# Patient Record
Sex: MALE | Race: WHITE | Employment: FULL TIME | URBAN - METROPOLITAN AREA
[De-identification: names, ages, dates, MRNs, and addresses within clinical notes are randomized per-mention and may not be internally consistent; named-entity substitution may affect disease eponyms.]

---

## 2018-04-13 PROBLEM — Z00.00 ENCOUNTER FOR GENERAL ADULT MEDICAL EXAMINATION WITHOUT ABNORMAL FINDINGS: Status: ACTIVE | Noted: 2017-08-23

## 2018-04-13 PROBLEM — E78.5 HYPERLIPIDEMIA: Status: ACTIVE | Noted: 2017-03-30

## 2018-04-13 PROBLEM — I10 BENIGN ESSENTIAL HYPERTENSION: Status: ACTIVE | Noted: 2017-03-30

## 2018-04-13 PROBLEM — K40.90 INGUINAL HERNIA, LEFT: Status: ACTIVE | Noted: 2017-03-30

## 2018-05-17 PROBLEM — M62.838 MUSCLE SPASM: Status: ACTIVE | Noted: 2018-05-17

## 2018-05-17 PROBLEM — K21.9 GASTROESOPHAGEAL REFLUX DISEASE WITHOUT ESOPHAGITIS: Status: ACTIVE | Noted: 2018-05-17

## 2018-05-17 PROBLEM — K59.04 CHRONIC IDIOPATHIC CONSTIPATION: Status: ACTIVE | Noted: 2018-05-17

## 2018-12-06 ENCOUNTER — OFFICE VISIT (OUTPATIENT)
Dept: FAMILY MEDICINE CLINIC | Age: 53
End: 2018-12-06

## 2018-12-06 VITALS
WEIGHT: 202.9 LBS | HEART RATE: 78 BPM | HEIGHT: 72 IN | OXYGEN SATURATION: 100 % | BODY MASS INDEX: 27.48 KG/M2 | DIASTOLIC BLOOD PRESSURE: 88 MMHG | RESPIRATION RATE: 16 BRPM | SYSTOLIC BLOOD PRESSURE: 124 MMHG | TEMPERATURE: 95.5 F

## 2018-12-06 DIAGNOSIS — E78.5 HYPERLIPIDEMIA, UNSPECIFIED HYPERLIPIDEMIA TYPE: ICD-10-CM

## 2018-12-06 DIAGNOSIS — I10 BENIGN ESSENTIAL HYPERTENSION: Primary | ICD-10-CM

## 2018-12-06 DIAGNOSIS — Z01.89 LABORATORY EXAMINATION: ICD-10-CM

## 2018-12-06 DIAGNOSIS — S86.912A MUSCLE STRAIN OF LEFT LOWER LEG, INITIAL ENCOUNTER: ICD-10-CM

## 2018-12-06 DIAGNOSIS — Z12.5 PROSTATE CANCER SCREENING: ICD-10-CM

## 2018-12-06 RX ORDER — LISINOPRIL 20 MG/1
20 TABLET ORAL DAILY
Qty: 90 TAB | Refills: 0 | Status: SHIPPED | OUTPATIENT
Start: 2018-12-06 | End: 2019-03-06 | Stop reason: SDUPTHER

## 2018-12-06 RX ORDER — SIMVASTATIN 40 MG/1
40 TABLET, FILM COATED ORAL DAILY
Qty: 90 TAB | Refills: 0 | Status: SHIPPED | OUTPATIENT
Start: 2018-12-06 | End: 2019-03-06 | Stop reason: SDUPTHER

## 2018-12-06 NOTE — PROGRESS NOTES
Barbara Laird  Identified pt with two pt identifiers(name and ). Chief Complaint Patient presents with  New Patient [facility] Establish Care  Leg Pain  
  left leg 1. Have you been to the ER, urgent care clinic since your last visit? Hospitalized since your last visit? No 
 
2. Have you seen or consulted any other health care providers outside of the [address] [place] [address] since your last visit? Include any pap smears or colon screening. NO In the event something were to happen to you and you were unable to speak on your behalf, do you have an Advance Directive/ Living Will in place stating your wishes? NO If yes, do we have a copy on file NO If no, would you like information:     
 
 
 
Would you like to sign up for MyChart today, if you have not already done so? Patient has mychart If not, would you like information on MyChart, and how to sign up at a later time? no 
 
 
Medication reconciliation up to date and corrected with patient at this time. Today's provider has been notified of reason for visit, vitals and flowsheets obtained on patients. Reviewed record in preparation for visit, huddled with provider and have obtained necessary documentation. Health Maintenance Due Topic  Hepatitis C Screening  DTaP/Tdap/Td series (1 - Tdap)  Shingrix Vaccine Age 50> (1 of 2)  FOBT Q 1 YEAR AGE 50-75 Wt Readings from Last 3 Encounters:  
18 202 lb 14.4 oz (92 kg) 18 203 lb (92.1 kg) 18 206 lb (93.4 kg) Temp Readings from Last 3 Encounters:  
No data found for Temp BP Readings from Last 3 Encounters:  
18 124/80  
18 130/82  
10/20/17 112/76 Pulse Readings from Last 3 Encounters:  
18 70  
10/20/17 70 Vitals:  
 18 1503 BP: 124/88 Pulse: 78 Resp: 16 Temp: 95.5 °F (35.3 °C) TempSrc: Oral  
SpO2: 100% Weight: 202 lb 14.4 oz (92 kg) Height: 5' 11.65\" (1.82 m) PainSc:   6 PainLoc: Leg  
 Learning Assessment: 
:  
 
No flowsheet data found. Depression Screening: 
:  
 
PHQ over the last two weeks 12/6/2018 Little interest or pleasure in doing things Not at all Feeling down, depressed, irritable, or hopeless Not at all Total Score PHQ 2 0 Fall Risk Assessment: 
:  
 
Fall Risk Assessment, last 12 mths 12/6/2018 Able to walk? Yes Fall in past 12 months? No  
 
 
Abuse Screening: 
:  
 
Abuse Screening Questionnaire 12/6/2018 Do you ever feel afraid of your partner? Jonathon Binder Are you in a relationship with someone who physically or mentally threatens you? Jonathon Binder Is it safe for you to go home? Y  
 
 
ADL Screening: 
:  
 

## 2018-12-06 NOTE — PROGRESS NOTES
Found office online. Moved here in August.  [place] from [place]. Last saw PCP in [place]. Last labs 9/2017. On cholesterol, BP meds. Last [facility] 9/2017. Colonoscopy '14.  10 yr repeat. Eye doctor past yr. No flu shots. Sxs left knee and lower leg week ago. First time trying to run on treadmill. Visit Vitals /88 (BP 1 Location: Left arm, BP Patient Position: Sitting) Pulse 78 Temp 95.5 °F (35.3 °C) (Oral) Resp 16 Ht 5' 11.65\" (1.82 m) Wt 202 lb 14.4 oz (92 kg) SpO2 100% BMI 27.78 kg/m² Patient alert and cooperative. Left knee - no swelling, warmth, erythema. Negative collaterals, negative drawer. Patella freely mobile. Has palpation tenderness medially below the knee joint at the muscle insertion onto the bone. Assessment: 1. Left lower leg muscle/ligament strain from running on the treadmill. Plan: 
1. Ice heat, antiinflammatory, relative rest. 
2. Follow up in 7-10 days if not improving, sooner worse for PT evaluation. 3. Refilled current BP and cholesterol meds. 4. Return for [facility] and lab work. 5. Follow otherwise here prn. TOTAL FACE TO FACE TIME OF 20 MINUTES SPENT WITH PATIENT. GREATER THAN 50% OF TIME WAS SPENT IN COUNSELING AND/OR COORDINATION OF CARE. SEE HPI, ASSESSMENT AND PLAN FOR DETAILS.

## 2019-02-28 DIAGNOSIS — E55.9 VITAMIN D DEFICIENCY: Primary | ICD-10-CM

## 2019-02-28 LAB
25(OH)D3+25(OH)D2 SERPL-MCNC: 14.7 NG/ML (ref 30–100)
ALBUMIN SERPL-MCNC: 4.8 G/DL (ref 3.5–5.5)
ALBUMIN/GLOB SERPL: 2.1 {RATIO} (ref 1.2–2.2)
ALP SERPL-CCNC: 71 IU/L (ref 39–117)
ALT SERPL-CCNC: 16 IU/L (ref 0–44)
APPEARANCE UR: CLEAR
AST SERPL-CCNC: 19 IU/L (ref 0–40)
BASOPHILS # BLD AUTO: 0 X10E3/UL (ref 0–0.2)
BASOPHILS NFR BLD AUTO: 1 %
BILIRUB SERPL-MCNC: 0.6 MG/DL (ref 0–1.2)
BILIRUB UR QL STRIP: NEGATIVE
BUN SERPL-MCNC: 11 MG/DL (ref 6–24)
BUN/CREAT SERPL: 11 (ref 9–20)
CALCIUM SERPL-MCNC: 9.7 MG/DL (ref 8.7–10.2)
CHLORIDE SERPL-SCNC: 103 MMOL/L (ref 96–106)
CHOLEST SERPL-MCNC: 154 MG/DL (ref 100–199)
CO2 SERPL-SCNC: 24 MMOL/L (ref 20–29)
COLOR UR: YELLOW
CREAT SERPL-MCNC: 0.97 MG/DL (ref 0.76–1.27)
EOSINOPHIL # BLD AUTO: 0.1 X10E3/UL (ref 0–0.4)
EOSINOPHIL NFR BLD AUTO: 1 %
ERYTHROCYTE [DISTWIDTH] IN BLOOD BY AUTOMATED COUNT: 12.9 % (ref 12.3–15.4)
GLOBULIN SER CALC-MCNC: 2.3 G/DL (ref 1.5–4.5)
GLUCOSE SERPL-MCNC: 98 MG/DL (ref 65–99)
GLUCOSE UR QL: NEGATIVE
HCT VFR BLD AUTO: 44.5 % (ref 37.5–51)
HDLC SERPL-MCNC: 40 MG/DL
HGB BLD-MCNC: 15.8 G/DL (ref 13–17.7)
HGB UR QL STRIP: NEGATIVE
IMM GRANULOCYTES # BLD AUTO: 0 X10E3/UL (ref 0–0.1)
IMM GRANULOCYTES NFR BLD AUTO: 0 %
INTERPRETATION, 910389: NORMAL
KETONES UR QL STRIP: NEGATIVE
LDLC SERPL CALC-MCNC: 78 MG/DL (ref 0–99)
LEUKOCYTE ESTERASE UR QL STRIP: NEGATIVE
LYMPHOCYTES # BLD AUTO: 1.6 X10E3/UL (ref 0.7–3.1)
LYMPHOCYTES NFR BLD AUTO: 25 %
MCH RBC QN AUTO: 32.5 PG (ref 26.6–33)
MCHC RBC AUTO-ENTMCNC: 35.5 G/DL (ref 31.5–35.7)
MCV RBC AUTO: 92 FL (ref 79–97)
MICRO URNS: NORMAL
MONOCYTES # BLD AUTO: 0.5 X10E3/UL (ref 0.1–0.9)
MONOCYTES NFR BLD AUTO: 7 %
NEUTROPHILS # BLD AUTO: 4.4 X10E3/UL (ref 1.4–7)
NEUTROPHILS NFR BLD AUTO: 66 %
NITRITE UR QL STRIP: NEGATIVE
PH UR STRIP: 7.5 [PH] (ref 5–7.5)
PLATELET # BLD AUTO: 285 X10E3/UL (ref 150–379)
POTASSIUM SERPL-SCNC: 4.2 MMOL/L (ref 3.5–5.2)
PROT SERPL-MCNC: 7.1 G/DL (ref 6–8.5)
PROT UR QL STRIP: NEGATIVE
PSA SERPL-MCNC: 0.7 NG/ML (ref 0–4)
RBC # BLD AUTO: 4.86 X10E6/UL (ref 4.14–5.8)
SODIUM SERPL-SCNC: 142 MMOL/L (ref 134–144)
SP GR UR: 1.01 (ref 1–1.03)
TRIGL SERPL-MCNC: 181 MG/DL (ref 0–149)
TSH SERPL DL<=0.005 MIU/L-ACNC: 3.42 UIU/ML (ref 0.45–4.5)
UROBILINOGEN UR STRIP-MCNC: 0.2 MG/DL (ref 0.2–1)
VLDLC SERPL CALC-MCNC: 36 MG/DL (ref 5–40)
WBC # BLD AUTO: 6.6 X10E3/UL (ref 3.4–10.8)

## 2019-02-28 RX ORDER — ERGOCALCIFEROL 1.25 MG/1
50000 CAPSULE ORAL 2 TIMES WEEKLY
Qty: 24 CAP | Refills: 0 | Status: SHIPPED | OUTPATIENT
Start: 2019-03-01 | End: 2019-07-24 | Stop reason: ALTCHOICE

## 2019-02-28 NOTE — PROGRESS NOTES
Verbal Order Read Back Per Eyal Muñiz MD for Vitamin D 50,000 units 2 times a week for 3 months, # 24, PO, 0 refills on 2019 due to Vitamin D deficiency. Erinn Duenas verified correct name and  with PCP. Verbal Order Read Back Per Eyal Muñiz MD for Vitamin D lab draw.  Erinn Duenas verified correct name and  with PCP

## 2019-03-06 ENCOUNTER — OFFICE VISIT (OUTPATIENT)
Dept: FAMILY MEDICINE CLINIC | Age: 54
End: 2019-03-06

## 2019-03-06 VITALS
BODY MASS INDEX: 28.07 KG/M2 | HEIGHT: 71 IN | WEIGHT: 200.5 LBS | OXYGEN SATURATION: 96 % | TEMPERATURE: 96.1 F | DIASTOLIC BLOOD PRESSURE: 86 MMHG | RESPIRATION RATE: 18 BRPM | HEART RATE: 91 BPM | SYSTOLIC BLOOD PRESSURE: 116 MMHG

## 2019-03-06 DIAGNOSIS — E78.5 HYPERLIPIDEMIA, UNSPECIFIED HYPERLIPIDEMIA TYPE: ICD-10-CM

## 2019-03-06 DIAGNOSIS — I10 BENIGN ESSENTIAL HYPERTENSION: ICD-10-CM

## 2019-03-06 RX ORDER — SIMVASTATIN 40 MG/1
40 TABLET, FILM COATED ORAL DAILY
Qty: 90 TAB | Refills: 1 | Status: SHIPPED | OUTPATIENT
Start: 2019-03-06 | End: 2019-09-11 | Stop reason: SDUPTHER

## 2019-03-06 RX ORDER — LISINOPRIL 20 MG/1
20 TABLET ORAL DAILY
Qty: 90 TAB | Refills: 1 | Status: SHIPPED | OUTPATIENT
Start: 2019-03-06 | End: 2019-09-09 | Stop reason: SDUPTHER

## 2019-03-06 NOTE — PROGRESS NOTES
Health Maintenance Due   Topic Date Due    Hepatitis C Screening  1965    DTaP/Tdap/Td series (1 - Tdap) 04/01/1986    Shingrix Vaccine Age 50> (1 of 2) 04/01/2015    FOBT Q 1 YEAR AGE 50-75  04/01/2015       Chief Complaint   Patient presents with    Vitamin D Deficiency     Rm 2/ non-fasting/     Cholesterol Problem       1. Have you been to the ER, urgent care clinic since your last visit? Hospitalized since your last visit? No    2. Have you seen or consulted any other health care providers outside of the [address] [place] [address] since your last visit? Include any pap smears or colon screening. No    3) Do you have an Advance Directive on file? no    4) Are you interested in receiving information on Advance Directives? NO      Patient is accompanied by self I have received verbal consent from Criss Stack, [address] to discuss any/all medical information while they are present in the room.

## 2019-03-06 NOTE — PROGRESS NOTES
Right knee sxs resolved. In AM mult BMs. Loose stools. Some urgency. No abdm pain nor cramping. Thinks 4-5 yrs. Had colonoscopy 1-2 yrs ago. 10 yr repeat. Poss lactose pb. Father with stomach pbs. Has anxiety. Had CHP past August.  Needs meds refilled. Will get records of colonoscopy, tetanus booster, and hep C. Patient denies chest pain, dyspnea, unexpected weight change, unexpected pain, mood or memory changes. Visit Vitals  /86 (BP 1 Location: Right arm, BP Patient Position: Sitting)   Pulse 91   Temp 96.1 °F (35.6 °C) (Oral)   Resp 18   Ht 5' 11\" (1.803 m)   Wt 200 lb 8 oz (90.9 kg)   SpO2 96%   BMI 27.96 kg/m²     Patient alert and cooperative. Reviewed above. Assessment:  1. Symptoms consistent with possible IBS. Plan:  1. Recommended to try OTC fiber supplements, try probiotics. 2. Follow up with GI if no improvement. 3. Return here for annual in August.  4. Recheck otherwise prn.

## 2019-07-24 ENCOUNTER — OFFICE VISIT (OUTPATIENT)
Dept: FAMILY MEDICINE CLINIC | Age: 54
End: 2019-07-24

## 2019-07-24 VITALS
BODY MASS INDEX: 28.61 KG/M2 | RESPIRATION RATE: 18 BRPM | HEART RATE: 91 BPM | TEMPERATURE: 96.6 F | HEIGHT: 71 IN | DIASTOLIC BLOOD PRESSURE: 82 MMHG | SYSTOLIC BLOOD PRESSURE: 115 MMHG | WEIGHT: 204.4 LBS | OXYGEN SATURATION: 97 %

## 2019-07-24 DIAGNOSIS — F41.9 ANXIETY: ICD-10-CM

## 2019-07-24 DIAGNOSIS — K59.04 CHRONIC IDIOPATHIC CONSTIPATION: Primary | ICD-10-CM

## 2019-07-24 DIAGNOSIS — I10 BENIGN ESSENTIAL HYPERTENSION: ICD-10-CM

## 2019-07-24 DIAGNOSIS — E78.5 HYPERLIPIDEMIA, UNSPECIFIED HYPERLIPIDEMIA TYPE: ICD-10-CM

## 2019-07-24 RX ORDER — CITALOPRAM 10 MG/1
10 TABLET ORAL DAILY
Qty: 30 TAB | Refills: 1 | Status: SHIPPED | OUTPATIENT
Start: 2019-07-24 | End: 2020-11-11

## 2019-07-24 NOTE — PROGRESS NOTES
Raphael Hewitt  Identified pt with two pt identifiers(name and ). Chief Complaint   Patient presents with    GI Problem     going to the bathroom multiple times during the morning    Anxiety    Request For New Medication     anxiety medication    Blood Pressure Check       1. Have you been to the ER, urgent care clinic since your last visit? Hospitalized since your last visit? No    2. Have you seen or consulted any other health care providers outside of the [address] [place] [address] since your last visit? Include any pap smears or colon screening. No      Would you like to sign up for MyChart today, if you have not already done so? Patient has a mychart  If not, would you like information on MyChart, and how to sign up at a later time? No      Medication reconciliation up to date and corrected with patient at this time. Today's provider has been notified of reason for visit, vitals and flowsheets obtained on patients. Reviewed record in preparation for visit, huddled with provider and have obtained necessary documentation. Health Maintenance Due   Topic    Hepatitis C Screening     DTaP/Tdap/Td series (1 - Tdap)    Shingrix Vaccine Age 50> (1 of 2)       Wt Readings from Last 3 Encounters:   19 204 lb 6.4 oz (92.7 kg)   19 200 lb 8 oz (90.9 kg)   18 202 lb 14.4 oz (92 kg)     Temp Readings from Last 3 Encounters:   19 96.6 °F (35.9 °C) (Oral)   19 96.1 °F (35.6 °C) (Oral)   18 95.5 °F (35.3 °C) (Oral)     BP Readings from Last 3 Encounters:   19 115/82   19 116/86   18 124/88     Pulse Readings from Last 3 Encounters:   19 91   19 91   18 78     Vitals:    19 1456   BP: 115/82   Pulse: 91   Resp: 18   Temp: 96.6 °F (35.9 °C)   TempSrc: Oral   SpO2: 97%   Weight: 204 lb 6.4 oz (92.7 kg)   Height: 5' 11\" (1.803 m)   PainSc:   0 - No pain         Learning Assessment:  :     No flowsheet data found.     Depression Screening:  :     3 most recent PHQ Screens 3/6/2019   Little interest or pleasure in doing things Not at all   Feeling down, depressed, irritable, or hopeless Not at all   Total Score PHQ 2 0       Fall Risk Assessment:  :     Fall Risk Assessment, last 12 mths 3/6/2019   Able to walk? Yes   Fall in past 12 months? No       Abuse Screening:  :     Abuse Screening Questionnaire 3/6/2019 12/6/2018   Do you ever feel afraid of your partner? N N   Are you in a relationship with someone who physically or mentally threatens you? N N   Is it safe for you to go home?  Y Y       ADL Screening:  :     ADL Assessment 12/6/2018   Feeding yourself No Help Needed   Getting from bed to chair No Help Needed   Getting dressed No Help Needed   Bathing or showering No Help Needed   Walk across the room (includes cane/walker) No Help Needed   Using the telphone No Help Needed   Taking your medications No Help Needed   Preparing meals No Help Needed   Managing money (expenses/bills) No Help Needed   Moderately strenuous housework (laundry) No Help Needed   Shopping for personal items (toiletries/medicines) No Help Needed   Shopping for groceries No Help Needed   Driving No Help Needed   Climbing a flight of stairs No Help Needed   Getting to places beyond walking distances No Help Needed

## 2019-07-24 NOTE — PROGRESS NOTES
Here for f/u of digestive pbs. Started Metamucil. Still gets up 5 AM and goes to [place] 4x before leaves for work. Does O.K. during day. No pb in PM.  Never on anxiety meds in past.  Past year more anxious. Family member committed suicide past March. Plenty of exercise. Watches diet. Patient denies chest pain, dyspnea, unexpected weight change, unexpected pain, mood or memory changes. Visit Vitals  /82 (BP 1 Location: Left arm, BP Patient Position: Sitting)   Pulse 91   Temp 96.6 °F (35.9 °C) (Oral)   Resp 18   Ht 5' 11\" (1.803 m)   Wt 204 lb 6.4 oz (92.7 kg)   SpO2 97%   BMI 28.51 kg/m²     Patient alert and cooperative. Reviewed above. Assessment:  1. Probable IBS with constipation and frequent stooling in the morning. 2. Anxiety disorder. Plan:  1. Set up GI for further evaluation. 2. Begin Celexa 10 mg daily. 3. Follow up through [address] how doing for anxiety. 4. Recheck here otherwise prn.

## 2019-07-25 LAB — 25(OH)D3+25(OH)D2 SERPL-MCNC: 25.9 NG/ML (ref 30–100)

## 2019-08-09 ENCOUNTER — HOSPITAL ENCOUNTER (OUTPATIENT)
Dept: GENERAL RADIOLOGY | Age: 54
Discharge: HOME | End: 2019-08-09
Attending: INTERNAL MEDICINE
Payer: COMMERCIAL

## 2019-08-09 DIAGNOSIS — R19.4 CHANGE IN BOWEL HABIT: ICD-10-CM

## 2019-08-09 DIAGNOSIS — K59.04 CHRONIC IDIOPATHIC CONSTIPATION: ICD-10-CM

## 2019-08-09 PROCEDURE — 74018 RADEX ABDOMEN 1 VIEW: CPT

## 2019-09-09 DIAGNOSIS — I10 BENIGN ESSENTIAL HYPERTENSION: ICD-10-CM

## 2019-09-10 NOTE — TELEPHONE ENCOUNTER
PCP: Cindy Marie MD    Last appt: 7/24/2019  No future appointments.     Requested Prescriptions     Pending Prescriptions Disp Refills    lisinopril (PRINIVIL, ZESTRIL) 20 mg tablet [Pharmacy Med Name: LISINOPRIL 20MG TABLETS] 90 Tab 0     Sig: TAKE 1 TABLET BY MOUTH EVERY DAY       Prior labs and Blood pressures:  BP Readings from Last 3 Encounters:   07/24/19 115/82   03/06/19 116/86   12/06/18 124/88     Lab Results   Component Value Date/Time    Sodium 142 02/27/2019 08:23 AM    Potassium 4.2 02/27/2019 08:23 AM    Chloride 103 02/27/2019 08:23 AM    CO2 24 02/27/2019 08:23 AM    Anion gap 13 09/09/2017 07:26 AM    Glucose 98 02/27/2019 08:23 AM    BUN 11 02/27/2019 08:23 AM    Creatinine 0.97 02/27/2019 08:23 AM    BUN/Creatinine ratio 11 02/27/2019 08:23 AM    GFR est  02/27/2019 08:23 AM    GFR est non-AA 89 02/27/2019 08:23 AM    Calcium 9.7 02/27/2019 08:23 AM     No results found for: HBA1C, HGBE8, KVR2JKKL, PBZ0FNTP  Lab Results   Component Value Date/Time    Cholesterol, total 154 02/27/2019 08:23 AM    HDL Cholesterol 40 02/27/2019 08:23 AM    LDL, calculated 78 02/27/2019 08:23 AM    VLDL, calculated 36 02/27/2019 08:23 AM    Triglyceride 181 (H) 02/27/2019 08:23 AM     Lab Results   Component Value Date/Time    VITAMIN D, 25-HYDROXY 25.9 (L) 07/24/2019 04:09 PM       Lab Results   Component Value Date/Time    TSH 3.420 02/27/2019 08:23 AM

## 2019-09-11 RX ORDER — LISINOPRIL 20 MG/1
TABLET ORAL
Qty: 90 TAB | Refills: 1 | Status: SHIPPED | OUTPATIENT
Start: 2019-09-11 | End: 2020-03-17

## 2019-09-12 RX ORDER — SIMVASTATIN 40 MG/1
40 TABLET, FILM COATED ORAL DAILY
Qty: 90 TAB | Refills: 1 | Status: SHIPPED | OUTPATIENT
Start: 2019-09-12 | End: 2020-03-17

## 2019-09-24 PROBLEM — Z00.00 ENCOUNTER FOR GENERAL ADULT MEDICAL EXAMINATION WITHOUT ABNORMAL FINDINGS: Status: RESOLVED | Noted: 2017-08-23 | Resolved: 2019-09-24

## 2020-02-21 ENCOUNTER — HOSPITAL ENCOUNTER (OUTPATIENT)
Dept: GENERAL RADIOLOGY | Age: 55
Discharge: HOME | End: 2020-02-21
Attending: INTERNAL MEDICINE
Payer: COMMERCIAL

## 2020-02-21 DIAGNOSIS — R05.9 COUGH: ICD-10-CM

## 2020-02-21 PROCEDURE — 71046 X-RAY EXAM CHEST 2 VIEWS: CPT

## 2020-03-17 DIAGNOSIS — I10 BENIGN ESSENTIAL HYPERTENSION: ICD-10-CM

## 2020-03-17 RX ORDER — LISINOPRIL 20 MG/1
TABLET ORAL
Qty: 30 TAB | Refills: 0 | Status: SHIPPED | OUTPATIENT
Start: 2020-03-17 | End: 2020-04-16

## 2020-03-17 RX ORDER — LISINOPRIL 20 MG/1
TABLET ORAL
Qty: 30 TAB | Refills: 0 | Status: SHIPPED | OUTPATIENT
Start: 2020-03-17 | End: 2020-03-17

## 2020-03-17 RX ORDER — SIMVASTATIN 40 MG/1
TABLET, FILM COATED ORAL
Qty: 30 TAB | Refills: 0 | Status: SHIPPED | OUTPATIENT
Start: 2020-03-17 | End: 2020-04-16

## 2020-03-17 RX ORDER — SIMVASTATIN 40 MG/1
TABLET, FILM COATED ORAL
Qty: 30 TAB | Refills: 0 | Status: SHIPPED | OUTPATIENT
Start: 2020-03-17 | End: 2020-03-17

## 2020-07-22 ENCOUNTER — TELEPHONE (OUTPATIENT)
Dept: FAMILY MEDICINE CLINIC | Age: 55
End: 2020-07-22

## 2020-07-22 ENCOUNTER — VIRTUAL VISIT (OUTPATIENT)
Dept: FAMILY MEDICINE CLINIC | Age: 55
End: 2020-07-22

## 2020-07-22 DIAGNOSIS — E78.5 HYPERLIPIDEMIA, UNSPECIFIED HYPERLIPIDEMIA TYPE: ICD-10-CM

## 2020-07-22 DIAGNOSIS — Z12.5 PROSTATE CANCER SCREENING: ICD-10-CM

## 2020-07-22 DIAGNOSIS — I10 BENIGN ESSENTIAL HYPERTENSION: Primary | ICD-10-CM

## 2020-07-22 DIAGNOSIS — I10 BENIGN ESSENTIAL HYPERTENSION: ICD-10-CM

## 2020-07-22 DIAGNOSIS — E55.9 VITAMIN D DEFICIENCY: ICD-10-CM

## 2020-07-22 RX ORDER — LOSARTAN POTASSIUM 50 MG/1
50 TABLET ORAL DAILY
Qty: 30 TAB | Refills: 0 | Status: SHIPPED | OUTPATIENT
Start: 2020-07-22 | End: 2020-09-04 | Stop reason: SDUPTHER

## 2020-07-22 NOTE — TELEPHONE ENCOUNTER
Called pt to inform that I have faxed labs over to the [facility] as requested. Pt verified understanding.

## 2020-07-22 NOTE — PROGRESS NOTES
**THIS IS A VIRTUAL VISIT VIA A VIDEO SYNCHRONOUS DISCUSSION OVER DOXY. ME PATIENT AGREED TO HAVE THEIR CARE DELIVERED OVER A [employer] VIDEO VISIT IN PLACE OF THEIR REGULARLY SCHEDULED OFFICE VISIT**       Risa Xie is a 54 y.o. male who was seen by synchronous (real-time) audio-video technology on 7/22/2020 for Medication Evaluation        Assessment & Plan:   Diagnoses and all orders for this visit:    1. Benign essential hypertension  -     losartan (COZAAR) 50 mg tablet; Take 1 Tab by mouth daily.  -     CBC WITH AUTOMATED DIFF; Future  -     URINALYSIS W/ RFLX MICROSCOPIC; Future  -     TSH 3RD GENERATION; Future  -     METABOLIC PANEL, COMPREHENSIVE; Future  -     LIPID PANEL; Future    2. Hyperlipidemia, unspecified hyperlipidemia type  -     LIPID PANEL; Future    3. Prostate cancer screening  -     PSA, DIAGNOSTIC (PROSTATE SPECIFIC AG); Future    4. Vitamin D deficiency  -     VITAMIN D, 25 HYDROXY; Future      The complexity of medical decision making for this visit is moderate         I spent at least 8 minutes on this visit with this established patient. Subjective:     Past Feb dx with flu. Saw pulm. Had allergy testing. Had VV with pulm who recommended change in BP med for breathing. Pulm also ordered some labs. Prior to Admission medications    Medication Sig Start Date End Date Taking? Authorizing Provider   lisinopriL (PRINIVIL, ZESTRIL) 20 mg tablet TAKE 1 TABLET BY MOUTH EVERY DAY 7/15/20  Yes Jamison Muñiz MD   simvastatin (ZOCOR) 40 mg tablet TAKE 1 TABLET BY MOUTH EVERY DAY 7/15/20  Yes Jamison Muñiz MD   psyllium husk (METAMUCIL PO) Take  by mouth. Yes Provider, Historical   ibuprofen (MOTRIN) 400 mg tablet Take 1 Tab by mouth four (4) times daily as needed. 5/16/17  Yes Provider, Historical   citalopram (CELEXA) 10 mg tablet Take 1 Tab by mouth daily.  7/24/19   Jamison Muñiz MD     Patient Active Problem List   Diagnosis Code    Hyperlipidemia E78.5    Benign essential hypertension I10    Inguinal hernia, left K40.90    Chronic idiopathic constipation K59.04    Gastroesophageal reflux disease without esophagitis K21.9    Muscle spasm M62.838     Current Outpatient Medications   Medication Sig Dispense Refill    lisinopriL (PRINIVIL, ZESTRIL) 20 mg tablet TAKE 1 TABLET BY MOUTH EVERY DAY 90 Tab 0    simvastatin (ZOCOR) 40 mg tablet TAKE 1 TABLET BY MOUTH EVERY DAY 90 Tab 0    psyllium husk (METAMUCIL PO) Take  by mouth.  ibuprofen (MOTRIN) 400 mg tablet Take 1 Tab by mouth four (4) times daily as needed.  citalopram (CELEXA) 10 mg tablet Take 1 Tab by mouth daily.  30 Tab 1     Allergies   Allergen Reactions    Cat Dander Other (comments)    Ragweed Pollen Other (comments)     Past Medical History:   Diagnosis Date    Hyperlipidemia     Hypertension     Inguinal hernia     left     Past Surgical History:   Procedure Laterality Date    HX HERNIA REPAIR Left 05/12/2017    laparoscopic totally extraperitoneal left inguinal hernia repair with meth     HX TONSILLECTOMY       Family History   Problem Relation Age of Onset    Asthma Mother     Heart Disease Father     Asthma Brother     Cancer Maternal Grandmother     Heart Disease Maternal Grandfather         hardening of arteries    Hypertension Other     Macular Degen Other      Social History     Tobacco Use    Smoking status: Never Smoker    Smokeless tobacco: Never Used   Substance Use Topics    Alcohol use: No       ROS    Objective:     Patient-Reported Vitals 7/22/2020   Patient-Reported Weight 200lb   Patient-Reported Height 6'   Patient-Reported Temperature 98.2        [INSTRUCTIONS:  \"[x]\" Indicates a positive item  \"[]\" Indicates a negative item  -- DELETE ALL ITEMS NOT EXAMINED]    Constitutional: [x] Appears well-developed and well-nourished [x] No apparent distress      [] Abnormal -     Mental status: [x] Alert and awake  [x] Oriented to person/place/time [x] Able to follow commands    [] Abnormal -     Eyes:   EOM    [x]  Normal    [] Abnormal -   Sclera  [x]  Normal    [] Abnormal -          Discharge [x]  None visible   [] Abnormal -     Pulmonary/Chest: [x] Respiratory effort normal   [x] No visualized signs of difficulty breathing or respiratory distress        [] Abnormal -      Psychiatric:       [x] Normal Affect [] Abnormal -        [x] No Hallucinations    Other pertinent observable physical exam findings:-        We discussed the expected course, resolution and complications of the diagnosis(es) in detail. Medication risks, benefits, costs, interactions, and alternatives were discussed as indicated. I advised him to contact the office if his condition worsens, changes or fails to improve as anticipated. He expressed understanding with the diagnosis(es) and plan. Dolores Piedra, who was evaluated through a patient-initiated, synchronous (real-time) audio-video encounter, and/or his healthcare decision maker, is aware that it is a billable service, with coverage as determined by his insurance carrier. He provided verbal consent to proceed: Yes, and patient identification was verified. It was conducted pursuant to the emergency declaration under the [facility], [address] [place] [address] authority and the [email or web address] and [employer] Act. A caregiver was present when appropriate. Ability to conduct physical exam was limited. I was at home. The patient was at home.       Michelle Martinez MD

## 2020-07-22 NOTE — PROGRESS NOTES
Dolores Piedra is a 54 y.o. male  HIPAA verified by two patient identifiers. Health Maintenance Due   Topic    Hepatitis C Screening     DTaP/Tdap/Td series (1 - Tdap)    Shingrix Vaccine Age 49> (1 of 2)    Lipid Screen      Patient-Reported Vitals 7/22/2020   Patient-Reported Weight 200lb   Patient-Reported Height 6'   Patient-Reported Temperature 98.2       Pain Scale:0 /10  Pain Location:             1. Have you been to the ER, urgent care clinic since your last visit? Hospitalized since your last visit? No    2. Have you seen or consulted any other health care providers outside of the [address] [place] [address] since your last visit? Include any pap smears or colon screening.  No      # [phone] Patient in meeting from 9:30 to 10:30

## 2020-07-22 NOTE — TELEPHONE ENCOUNTER
----- Message from Roderick Irene sent at 7/22/2020 12:03 PM EDT -----  Regarding: Dr. Medina Brownwood: [phone]  Caller's first and last name: n/a  Callback required yes/no and why: yes  Best contact number(s): [phone]  Details to clarify the request: He met with Dr. Clarice Craig today 7/22 and he is scheduled for lab work this Friday 7/24 at [facility] on [facility] so he needs Dr. Clarice Craig to send his lab order there.

## 2020-07-25 LAB
25(OH)D3+25(OH)D2 SERPL-MCNC: 36.1 NG/ML (ref 30–100)
ALBUMIN SERPL-MCNC: 4.7 G/DL (ref 3.8–4.9)
ALBUMIN/GLOB SERPL: 1.9 {RATIO} (ref 1.2–2.2)
ALP SERPL-CCNC: 61 IU/L (ref 39–117)
ALT SERPL-CCNC: 20 IU/L (ref 0–44)
APPEARANCE UR: CLEAR
AST SERPL-CCNC: 25 IU/L (ref 0–40)
BASOPHILS # BLD AUTO: 0.1 X10E3/UL (ref 0–0.2)
BASOPHILS NFR BLD AUTO: 1 %
BILIRUB SERPL-MCNC: 0.4 MG/DL (ref 0–1.2)
BILIRUB UR QL STRIP: NEGATIVE
BUN SERPL-MCNC: 13 MG/DL (ref 6–24)
BUN/CREAT SERPL: 15 (ref 9–20)
CALCIUM SERPL-MCNC: 9.7 MG/DL (ref 8.7–10.2)
CHLORIDE SERPL-SCNC: 102 MMOL/L (ref 96–106)
CHOLEST SERPL-MCNC: 157 MG/DL (ref 100–199)
CO2 SERPL-SCNC: 22 MMOL/L (ref 20–29)
COLOR UR: YELLOW
CREAT SERPL-MCNC: 0.89 MG/DL (ref 0.76–1.27)
EOSINOPHIL # BLD AUTO: 0.1 X10E3/UL (ref 0–0.4)
EOSINOPHIL NFR BLD AUTO: 1 %
ERYTHROCYTE [DISTWIDTH] IN BLOOD BY AUTOMATED COUNT: 12.7 % (ref 11.6–15.4)
GLOBULIN SER CALC-MCNC: 2.5 G/DL (ref 1.5–4.5)
GLUCOSE SERPL-MCNC: 99 MG/DL (ref 65–99)
GLUCOSE UR QL: NEGATIVE
HCT VFR BLD AUTO: 44.7 % (ref 37.5–51)
HDLC SERPL-MCNC: 38 MG/DL
HGB BLD-MCNC: 15.6 G/DL (ref 13–17.7)
HGB UR QL STRIP: NEGATIVE
IMM GRANULOCYTES # BLD AUTO: 0 X10E3/UL (ref 0–0.1)
IMM GRANULOCYTES NFR BLD AUTO: 0 %
INTERPRETATION, 910389: NORMAL
KETONES UR QL STRIP: NEGATIVE
LDLC SERPL CALC-MCNC: 46 MG/DL (ref 0–99)
LEUKOCYTE ESTERASE UR QL STRIP: NEGATIVE
LYMPHOCYTES # BLD AUTO: 2.1 X10E3/UL (ref 0.7–3.1)
LYMPHOCYTES NFR BLD AUTO: 32 %
MCH RBC QN AUTO: 32.3 PG (ref 26.6–33)
MCHC RBC AUTO-ENTMCNC: 34.9 G/DL (ref 31.5–35.7)
MCV RBC AUTO: 93 FL (ref 79–97)
MICRO URNS: NORMAL
MONOCYTES # BLD AUTO: 0.4 X10E3/UL (ref 0.1–0.9)
MONOCYTES NFR BLD AUTO: 7 %
NEUTROPHILS # BLD AUTO: 3.9 X10E3/UL (ref 1.4–7)
NEUTROPHILS NFR BLD AUTO: 59 %
NITRITE UR QL STRIP: NEGATIVE
PH UR STRIP: 6 [PH] (ref 5–7.5)
PLATELET # BLD AUTO: 313 X10E3/UL (ref 150–450)
POTASSIUM SERPL-SCNC: 4.1 MMOL/L (ref 3.5–5.2)
PROT SERPL-MCNC: 7.2 G/DL (ref 6–8.5)
PROT UR QL STRIP: NEGATIVE
PSA SERPL-MCNC: 0.4 NG/ML (ref 0–4)
RBC # BLD AUTO: 4.83 X10E6/UL (ref 4.14–5.8)
SODIUM SERPL-SCNC: 141 MMOL/L (ref 134–144)
SP GR UR: 1.01 (ref 1–1.03)
TRIGL SERPL-MCNC: 364 MG/DL (ref 0–149)
TSH SERPL DL<=0.005 MIU/L-ACNC: 3.48 UIU/ML (ref 0.45–4.5)
UROBILINOGEN UR STRIP-MCNC: 0.2 MG/DL (ref 0.2–1)
VLDLC SERPL CALC-MCNC: 73 MG/DL (ref 5–40)
WBC # BLD AUTO: 6.6 X10E3/UL (ref 3.4–10.8)

## 2020-09-04 DIAGNOSIS — I10 BENIGN ESSENTIAL HYPERTENSION: ICD-10-CM

## 2020-09-04 NOTE — TELEPHONE ENCOUNTER
Requested Prescriptions     Pending Prescriptions Disp Refills    losartan (COZAAR) 50 mg tablet 30 Tab 0     Sig: Take 1 Tab by mouth daily.      PATIENT CALLED AND STATED THAT THE LOSARTIN IS WORKING GOOD AND HE NEEDS A REFILL

## 2020-09-07 RX ORDER — LOSARTAN POTASSIUM 50 MG/1
50 TABLET ORAL DAILY
Qty: 30 TAB | Refills: 0 | Status: SHIPPED | OUTPATIENT
Start: 2020-09-07 | End: 2020-09-14

## 2020-09-08 DIAGNOSIS — I10 BENIGN ESSENTIAL HYPERTENSION: ICD-10-CM

## 2020-09-14 RX ORDER — LOSARTAN POTASSIUM 50 MG/1
TABLET ORAL
Qty: 90 TAB | Refills: 0 | Status: SHIPPED | OUTPATIENT
Start: 2020-09-14 | End: 2020-11-11 | Stop reason: SDUPTHER

## 2020-11-11 ENCOUNTER — OFFICE VISIT (OUTPATIENT)
Dept: FAMILY MEDICINE CLINIC | Age: 55
End: 2020-11-11
Payer: COMMERCIAL

## 2020-11-11 VITALS
BODY MASS INDEX: 29.41 KG/M2 | WEIGHT: 210.1 LBS | TEMPERATURE: 97.9 F | RESPIRATION RATE: 17 BRPM | HEIGHT: 71 IN | SYSTOLIC BLOOD PRESSURE: 126 MMHG | HEART RATE: 106 BPM | DIASTOLIC BLOOD PRESSURE: 86 MMHG | OXYGEN SATURATION: 95 %

## 2020-11-11 DIAGNOSIS — K59.04 CHRONIC IDIOPATHIC CONSTIPATION: ICD-10-CM

## 2020-11-11 DIAGNOSIS — I10 BENIGN ESSENTIAL HYPERTENSION: ICD-10-CM

## 2020-11-11 DIAGNOSIS — E78.5 HYPERLIPIDEMIA, UNSPECIFIED HYPERLIPIDEMIA TYPE: Primary | ICD-10-CM

## 2020-11-11 DIAGNOSIS — E78.1 HYPERTRIGLYCERIDEMIA: ICD-10-CM

## 2020-11-11 PROCEDURE — 99214 OFFICE O/P EST MOD 30 MIN: CPT | Performed by: PHYSICIAN ASSISTANT

## 2020-11-11 RX ORDER — SIMVASTATIN 40 MG/1
TABLET, FILM COATED ORAL
Qty: 90 TAB | Refills: 2 | Status: SHIPPED | OUTPATIENT
Start: 2020-11-11 | End: 2021-04-27 | Stop reason: SDUPTHER

## 2020-11-11 RX ORDER — LOSARTAN POTASSIUM 50 MG/1
TABLET ORAL
Qty: 90 TAB | Refills: 3 | Status: SHIPPED | OUTPATIENT
Start: 2020-11-11 | End: 2021-06-11 | Stop reason: SDUPTHER

## 2020-11-11 NOTE — PROGRESS NOTES
HPI:  54 y.o.  presents for follow up appointment. No hospital, ER or specialist visits since last primary care visit except as noted below. PCP is Dr Shawnee Hernandez, who is out of the office, and will be retiring 12/31/20, so pt will resume care with me    Hypertension and HLD- compliant with medication, losartan 50 mg daily (lisinopril causes cough), and simvastatin 40 mg; no home monitoring. No history of heart disease or stroke. No chest pain, no shortness of breath, no headaches, no lower extremity swelling.       Has form from insurance co that they will cover a BP cuff, needs bicep circumference, I measure today and it is 12 inches  TG were up last check, has been eating frozen [employer] mac and cheese for lunch and chips every day  Lab Results   Component Value Date/Time    Cholesterol, total 157 07/24/2020 04:41 PM    HDL Cholesterol 38 (L) 07/24/2020 04:41 PM    LDL, calculated 46 07/24/2020 04:41 PM    VLDL, calculated 73 (H) 07/24/2020 04:41 PM    Triglyceride 364 (H) 07/24/2020 04:41 PM       Feeling of incomplete emptying after BM - saw GI, recommended fiber supplement which pt states seems to help, another med was suggested but pt was concerned about sdie effects so is just sticking with fiber; had normal colonoscopy in [facility] 2017    Anxiety - did not take the celexa last year, grief reaction with suicide of nephew in march 2018; has \"navigated\" through the family changes with good support; his sister is now raising her 4 grandchildren    , 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[employer], works as  for intellectually disabled population, no children    Patient Active Problem List    Diagnosis    Chronic idiopathic constipation    Gastroesophageal reflux disease without esophagitis    Muscle spasm    Hyperlipidemia    Benign essential hypertension    Inguinal hernia, left         Past Medical History:   Diagnosis Date    Hyperlipidemia     Hypertension     Inguinal hernia     left       Social History     Tobacco Use    Smoking status: Never Smoker    Smokeless tobacco: Never Used   Substance Use Topics    Alcohol use: No    Drug use: No       Outpatient Medications Marked as Taking for the 11/11/20 encounter (Office Visit) with Chhaya Chamberlain PA-C   Medication Sig Dispense Refill    simvastatin (ZOCOR) 40 mg tablet TAKE 1 TABLET BY MOUTH EVERY DAY 90 Tab 2    losartan (COZAAR) 50 mg tablet TAKE 1 TABLET BY MOUTH DAILY 90 Tab 0    psyllium husk (METAMUCIL PO) Take  by mouth.  citalopram (CELEXA) 10 mg tablet Take 1 Tab by mouth daily. 30 Tab 1    ibuprofen (MOTRIN) 400 mg tablet Take 1 Tab by mouth four (4) times daily as needed. Allergies   Allergen Reactions    Cat Dander Other (comments)    Ragweed Pollen Other (comments)       ROS:  ROS negative except as per HPI. PE:  Visit Vitals  /86 (BP 1 Location: Right arm, BP Patient Position: Sitting)   Pulse (!) 106   Temp 97.9 °F (36.6 °C) (Temporal)   Resp 17   Ht 5' 11\" (1.803 m)   Wt 210 lb 1.6 oz (95.3 kg)   SpO2 95%   BMI 29.30 kg/m²     Gen: alert, oriented, no acute distress  Head: normocephalic, atraumatic  Eyes: pupils equal round reactive to light, sclera clear, conjunctiva clear  Neck: symmetric normal sized thyroid, no carotid bruits, no jugular vein distention  Resp: no increase work of breathing, lungs clear to ausculation bilaterally, no wheezing, rales or rhonchi  CV: S1, S2 normal.  No murmurs, rubs, or gallops. Abd: soft, not tender, not distended. No hepatosplenomegaly. Normal bowel sounds. Neuro:normal movement in all extremities, sensation intact and symmetric. Skin: no lesion or rash  Extremities: no cyanosis or edema; right upper arm circumference 12 inches    No results found for this visit on 11/11/20. Assessment/Plan:    1.  Hyperlipidemia, unspecified hyperlipidemia type  LDL 46  Continue current zocor  - simvastatin (ZOCOR) 40 mg tablet; TAKE 1 TABLET BY MOUTH EVERY DAY  Dispense: 90 Tab; Refill: 2  - LIPID PANEL; Future    2. Hypertriglyceridemia    We reviewed low triglyceride diet in detail, handout given  Advised to stop the [employer] mac and cheese every day  Recheck in 3-4 months    3. Benign essential hypertension  HTN - well controlled. Continue current medication. Exercise, and low salt/ low caffeine diet were discussed. - losartan (COZAAR) 50 mg tablet; TAKE 1 TABLET BY MOUTH DAILY  Dispense: 90 Tab; Refill: 3    4. Chronic idiopathic constipation  Controlled on daily fiber supplement    Follow-up and Dispositions    · Return in about 6 months (around 5/11/2021) for fasting lab appt for lat Feb or March, 6 mo visit for HTN. Health Maintenance reviewed - updated, declined flu shot    Orders Placed This Encounter    LIPID PANEL     Standing Status:   Future     Standing Expiration Date:   11/11/2021    losartan (COZAAR) 50 mg tablet     Sig: TAKE 1 TABLET BY MOUTH DAILY     Dispense:  90 Tab     Refill:  3    simvastatin (ZOCOR) 40 mg tablet     Sig: TAKE 1 TABLET BY MOUTH EVERY DAY     Dispense:  90 Tab     Refill:  2       Medications Discontinued During This Encounter   Medication Reason    citalopram (CELEXA) 10 mg tablet Not A Current Medication    losartan (COZAAR) 50 mg tablet REORDER    simvastatin (ZOCOR) 40 mg tablet REORDER       Current Outpatient Medications   Medication Sig Dispense Refill    losartan (COZAAR) 50 mg tablet TAKE 1 TABLET BY MOUTH DAILY 90 Tab 3    simvastatin (ZOCOR) 40 mg tablet TAKE 1 TABLET BY MOUTH EVERY DAY 90 Tab 2    psyllium husk (METAMUCIL PO) Take  by mouth.  ibuprofen (MOTRIN) 400 mg tablet Take 1 Tab by mouth four (4) times daily as needed. Recommended healthy diet low in carbohydrates, fats, sodium and cholesterol. Recommended regular cardiovascular exercise 3-6 times per week for 30-60 minutes daily. Verbal and written instructions (see AVS) provided.   Patient expresses understanding of diagnosis and treatment plan.

## 2020-11-11 NOTE — PROGRESS NOTES
Parish Rojo is a 54 y.o. male    HIPAA verified by two patient identifiers. Chief Complaint   Patient presents with    Medication Refill     For future refills      1. Have you been to the ER, urgent care clinic since your last visit? Hospitalized since your last visit? No    2. Have you seen or consulted any other health care providers outside of the [address] [place] [address] since your last visit? Include any pap smears or colon screening. No    Visit Vitals  /86 (BP 1 Location: Right arm, BP Patient Position: Sitting)   Pulse (!) 106   Temp 97.9 °F (36.6 °C) (Temporal)   Resp 17   Ht 5' 11\" (1.803 m)   Wt 210 lb 1.6 oz (95.3 kg)   SpO2 95%   BMI 29.30 kg/m²       Pain Scale: 0 - No pain/10  Pain Location:     Pt refused Flu shot today.

## 2020-11-11 NOTE — PATIENT INSTRUCTIONS
Learning About High Cholesterol What is high cholesterol? High cholesterol means that you have too much cholesterol in your blood. Cholesterol is a type of fat. It's needed for many body functions, such as making new cells. Cholesterol is made by your body. It also comes from food you eat. Having high cholesterol can lead to the buildup of plaque in artery walls. This can increase your risk of heart disease and stroke. When your doctor talks about high cholesterol levels, he or she is talking about your total cholesterol and LDL cholesterol (the \"bad\" cholesterol) levels. Your doctor may also speak about HDL (the \"good\" cholesterol) levels. High HDL is linked with a lower risk for heart disease, heart attack, and stroke. Your cholesterol levels help your doctor find out your risk for having a heart attack or stroke. How can you prevent high cholesterol? A heart-healthy lifestyle can help you prevent high cholesterol. This lifestyle helps lower your risk for a heart attack and stroke. · Eat heart-healthy foods. ? Eat fruits, vegetables, whole grains (like oatmeal), dried beans and peas, nuts and seeds, soy products (like tofu), and fat-free or low-fat dairy products. ? Replace butter, margarine, and hydrogenated or partially hydrogenated oils with olive and canola oils. (Canola oil margarine without trans fat is fine.) ? Replace red meat with fish, poultry, and soy protein (like tofu). ? Limit processed and packaged foods like chips, crackers, and cookies. · Be active. Exercise can improve your cholesterol level. Get at least 30 minutes of exercise on most days of the week. Walking is a good choice. You also may want to do other activities, such as running, swimming, cycling, or playing tennis or team sports. · Stay at a healthy weight. Lose weight if you need to. · Don't smoke.  If you need help quitting, talk to your doctor about stop-smoking programs and medicines. These can increase your chances of quitting for good. How is high cholesterol treated? The goal of treatment is to reduce your chances of having a heart attack or stroke. The goal is not to lower your cholesterol numbers only. · You may make lifestyle changes, such as eating healthy foods, not smoking, losing weight, and being more active. · You may have to take medicine. Follow-up care is a key part of your treatment and safety. Be sure to make and go to all appointments, and call your doctor if you are having problems. It's also a good idea to know your test results and keep a list of the medicines you take. Where can you learn more? Go to http://www.gray.com/ Enter I510 in the search box to learn more about \"Learning About High Cholesterol. \" Current as of: December 16, 2019               Content Version: 12.6 © 5106-2548 [employer]. Care instructions adapted under license by [employer] (which disclaims liability or warranty for this information). If you have questions about a medical condition or this instruction, always ask your healthcare professional. [address] any warranty or liability for your use of this information.

## 2021-02-15 ENCOUNTER — LAB ONLY (OUTPATIENT)
Dept: FAMILY MEDICINE CLINIC | Age: 56
End: 2021-02-15

## 2021-02-15 DIAGNOSIS — E78.5 HYPERLIPIDEMIA, UNSPECIFIED HYPERLIPIDEMIA TYPE: ICD-10-CM

## 2021-02-15 LAB
CHOLEST SERPL-MCNC: 160 MG/DL
HDLC SERPL-MCNC: 40 MG/DL
HDLC SERPL: 4 {RATIO} (ref 0–5)
LDLC SERPL CALC-MCNC: 70.4 MG/DL (ref 0–100)
LIPID PROFILE,FLP: ABNORMAL
TRIGL SERPL-MCNC: 248 MG/DL (ref ?–150)
VLDLC SERPL CALC-MCNC: 49.6 MG/DL

## 2021-02-18 NOTE — PROGRESS NOTES
danisha onofreg sent  The triglycerides have improved with your dietary changes! They do remain elevated but are coming down, which is great. Continue same dose simvastatin, and follow the low fat diet, and eliminate processed and fried foods. You may add fish oil 1000 mg twice a day. Look for formulations of the fish oil that reduce the chances of fishy burps! Recheck in 6 months.

## 2021-04-19 ENCOUNTER — TELEPHONE (OUTPATIENT)
Dept: FAMILY MEDICINE CLINIC | Age: 56
End: 2021-04-19

## 2021-04-19 DIAGNOSIS — E78.5 HYPERLIPIDEMIA, UNSPECIFIED HYPERLIPIDEMIA TYPE: ICD-10-CM

## 2021-04-19 RX ORDER — SIMVASTATIN 40 MG/1
TABLET, FILM COATED ORAL
Qty: 90 TAB | Refills: 2 | Status: CANCELLED | OUTPATIENT
Start: 2021-04-19

## 2021-04-19 NOTE — TELEPHONE ENCOUNTER
Duplicate request: Zocor 40 mg #90 with 2 refills was sent to [facility] on 11/11/2020. Pt has an appointment on 06/11/2021 with a new provider Dr. Florin Love.        Requested Prescriptions     Pending Prescriptions Disp Refills    simvastatin (ZOCOR) 40 mg tablet 90 Tab 2     Sig: TAKE 1 TABLET BY MOUTH EVERY DAY

## 2021-04-19 NOTE — TELEPHONE ENCOUNTER
Please reach out to pt to clarify his request  -the last refill of simvastatin (Zocor) 40 mg was sent in 11/2020 for #90, plus 2 RF, so the quantity should last until August 2021  -he should still have a refill left that will last through his June appt with new PCP  -he should request with his pharmacy (unless he needs it sent in to new pharmacy)

## 2021-04-19 NOTE — TELEPHONE ENCOUNTER
----- Message from Coleen Huerta sent at 4/19/2021  8:23 AM EDT -----  Regarding: JOIE Chamberlain/ Refill  Medication Refill    Caller (if not patient):pt      Relationship of caller (if not patient):pt      Best contact number(s):[phone]      Name of medication and dosage if known: Simvastatin      Is patient out of this medication (yes/no):no      Pharmacy name: [address] [place] [address] listed in chart? (yes/no):yes  Pharmacy phone number:n/a      Details to clarify the request:n/a      Coleen Huerta

## 2021-04-27 DIAGNOSIS — E78.5 HYPERLIPIDEMIA, UNSPECIFIED HYPERLIPIDEMIA TYPE: ICD-10-CM

## 2021-04-27 NOTE — TELEPHONE ENCOUNTER
Last Refill: 11/11/2020  Last Visit: 11/11/2020   Next Visit: Visit date not found    Requested Prescriptions     Pending Prescriptions Disp Refills    simvastatin (ZOCOR) 40 mg tablet 90 Tab 2     Sig: TAKE 1 TABLET BY MOUTH EVERY DAY

## 2021-04-28 RX ORDER — SIMVASTATIN 40 MG/1
TABLET, FILM COATED ORAL
Qty: 90 TAB | Refills: 0 | Status: SHIPPED | OUTPATIENT
Start: 2021-04-28 | End: 2021-06-11 | Stop reason: SDUPTHER

## 2021-06-11 ENCOUNTER — OFFICE VISIT (OUTPATIENT)
Dept: FAMILY MEDICINE CLINIC | Age: 56
End: 2021-06-11
Payer: COMMERCIAL

## 2021-06-11 VITALS
OXYGEN SATURATION: 98 % | BODY MASS INDEX: 28.98 KG/M2 | DIASTOLIC BLOOD PRESSURE: 77 MMHG | TEMPERATURE: 97.5 F | RESPIRATION RATE: 18 BRPM | WEIGHT: 207 LBS | SYSTOLIC BLOOD PRESSURE: 133 MMHG | HEIGHT: 71 IN | HEART RATE: 75 BPM

## 2021-06-11 DIAGNOSIS — E78.5 HYPERLIPIDEMIA, UNSPECIFIED HYPERLIPIDEMIA TYPE: ICD-10-CM

## 2021-06-11 DIAGNOSIS — Z13.1 SCREENING FOR DIABETES MELLITUS: ICD-10-CM

## 2021-06-11 DIAGNOSIS — Z13.89 SCREENING FOR BLOOD OR PROTEIN IN URINE: ICD-10-CM

## 2021-06-11 DIAGNOSIS — I10 BENIGN ESSENTIAL HYPERTENSION: ICD-10-CM

## 2021-06-11 DIAGNOSIS — R19.8 GI SYMPTOMS: ICD-10-CM

## 2021-06-11 DIAGNOSIS — Z11.59 ENCOUNTER FOR HEPATITIS C SCREENING TEST FOR LOW RISK PATIENT: ICD-10-CM

## 2021-06-11 DIAGNOSIS — Z23 NEED FOR SHINGLES VACCINE: ICD-10-CM

## 2021-06-11 DIAGNOSIS — Z13.29 SCREENING FOR THYROID DISORDER: ICD-10-CM

## 2021-06-11 DIAGNOSIS — Z91.89 AT RISK FOR OBSTRUCTIVE SLEEP APNEA: ICD-10-CM

## 2021-06-11 DIAGNOSIS — Z76.89 ENCOUNTER TO ESTABLISH CARE: Primary | ICD-10-CM

## 2021-06-11 LAB
ALBUMIN SERPL-MCNC: 4.6 G/DL (ref 3.5–5)
ALBUMIN/GLOB SERPL: 1.5 {RATIO} (ref 1.1–2.2)
ALP SERPL-CCNC: 82 U/L (ref 45–117)
ALT SERPL-CCNC: 27 U/L (ref 12–78)
ANION GAP SERPL CALC-SCNC: 5 MMOL/L (ref 5–15)
APPEARANCE UR: CLEAR
AST SERPL-CCNC: 20 U/L (ref 15–37)
BILIRUB SERPL-MCNC: 0.7 MG/DL (ref 0.2–1)
BILIRUB UR QL: NEGATIVE
BUN SERPL-MCNC: 13 MG/DL (ref 6–20)
BUN/CREAT SERPL: 15 (ref 12–20)
CALCIUM SERPL-MCNC: 9.8 MG/DL (ref 8.5–10.1)
CHLORIDE SERPL-SCNC: 107 MMOL/L (ref 97–108)
CO2 SERPL-SCNC: 28 MMOL/L (ref 21–32)
COLOR UR: NORMAL
CREAT SERPL-MCNC: 0.89 MG/DL (ref 0.7–1.3)
ERYTHROCYTE [DISTWIDTH] IN BLOOD BY AUTOMATED COUNT: 12.5 % (ref 11.5–14.5)
EST. AVERAGE GLUCOSE BLD GHB EST-MCNC: 103 MG/DL
GLOBULIN SER CALC-MCNC: 3.1 G/DL (ref 2–4)
GLUCOSE SERPL-MCNC: 91 MG/DL (ref 65–100)
GLUCOSE UR STRIP.AUTO-MCNC: NEGATIVE MG/DL
HBA1C MFR BLD: 5.2 % (ref 4–5.6)
HCT VFR BLD AUTO: 46.9 % (ref 36.6–50.3)
HGB BLD-MCNC: 16.4 G/DL (ref 12.1–17)
HGB UR QL STRIP: NEGATIVE
KETONES UR QL STRIP.AUTO: NEGATIVE MG/DL
LEUKOCYTE ESTERASE UR QL STRIP.AUTO: NEGATIVE
MCH RBC QN AUTO: 31.6 PG (ref 26–34)
MCHC RBC AUTO-ENTMCNC: 35 G/DL (ref 30–36.5)
MCV RBC AUTO: 90.4 FL (ref 80–99)
NITRITE UR QL STRIP.AUTO: NEGATIVE
NRBC # BLD: 0 K/UL (ref 0–0.01)
NRBC BLD-RTO: 0 PER 100 WBC
PH UR STRIP: 7 [PH] (ref 5–8)
PLATELET # BLD AUTO: 294 K/UL (ref 150–400)
PMV BLD AUTO: 9 FL (ref 8.9–12.9)
POTASSIUM SERPL-SCNC: 3.9 MMOL/L (ref 3.5–5.1)
PROT SERPL-MCNC: 7.7 G/DL (ref 6.4–8.2)
PROT UR STRIP-MCNC: NEGATIVE MG/DL
RBC # BLD AUTO: 5.19 M/UL (ref 4.1–5.7)
SODIUM SERPL-SCNC: 140 MMOL/L (ref 136–145)
SP GR UR REFRACTOMETRY: 1.01 (ref 1–1.03)
TSH SERPL DL<=0.05 MIU/L-ACNC: 2.29 UIU/ML (ref 0.36–3.74)
UROBILINOGEN UR QL STRIP.AUTO: 0.2 EU/DL (ref 0.2–1)
WBC # BLD AUTO: 6.9 K/UL (ref 4.1–11.1)

## 2021-06-11 PROCEDURE — 99203 OFFICE O/P NEW LOW 30 MIN: CPT | Performed by: FAMILY MEDICINE

## 2021-06-11 RX ORDER — LOSARTAN POTASSIUM 50 MG/1
TABLET ORAL
Qty: 90 TABLET | Refills: 1 | Status: SHIPPED | OUTPATIENT
Start: 2021-06-11 | End: 2022-01-31 | Stop reason: SDUPTHER

## 2021-06-11 RX ORDER — SIMVASTATIN 40 MG/1
TABLET, FILM COATED ORAL
Qty: 90 TABLET | Refills: 1 | Status: SHIPPED | OUTPATIENT
Start: 2021-06-11 | End: 2022-01-31 | Stop reason: SDUPTHER

## 2021-06-11 NOTE — PROGRESS NOTES
Chief Complaint   Patient presents with   [facility] Patient     Establish Care. 1. Have you been to the ER, urgent care clinic since your last visit? Hospitalized since your last visit? No    2. Have you seen or consulted any other health care providers outside of the [address] [place] [address] since your last visit? Include any pap smears or colon screening.  No

## 2021-06-11 NOTE — PROGRESS NOTES
Lexy Thompson  64 y.o. male  1965  [address] [facility] [address] [address]  [id number]     [facility]       Encounter Date: 6/11/2021           New Patient Visit Note: Sanjay Nina MD      Reason for Appointment:  Chief Complaint   Patient presents with   [address] [place] [address] Patient     Establish Care. History of Present Illness:  History provided by patient    Lexy Thompson is a 64 y.o. male who presents to clinic today for appointment to establish care. Conditions addressed today include:     HTN: reports doing well on losartan, not checking BP at home  HLD: taking zocor; denies any side effects  GI Symptoms: going multiple times and irregular bowel movements. : has seen GI in the past who recommedned medicine. Kirit did not want to have to take medicines for it. He would prefer to try dietary changes. He reports that he tries to exercise and walk regularly    He reports that he saw pulmonology with normal PFTs. He had allergy testing showing allergy to ragweed. Has received COVID vaccine    Patient endorses snoring and daytime fatigue    Review of Systems  All other ROS were reviewed and are negative except as discussed in HPI        Allergies: Cat dander and Ragweed pollen    Medications: (Updated to reflect final medication list after visit)    Current Outpatient Medications:     simvastatin (ZOCOR) 40 mg tablet, TAKE 1 TABLET BY MOUTH EVERY DAY, Disp: 90 Tablet, Rfl: 1    losartan (COZAAR) 50 mg tablet, TAKE 1 TABLET BY MOUTH DAILY, Disp: 90 Tablet, Rfl: 1    History  Patient Care Team:  Rubie Ormond, MD as PCP - General (Family Medicine)  Rubie Ormond, MD as PCP - [facility][1[facility]  Suzi Pablo NP as Nurse Practitioner (Dermatology)  Jenaro Asif MD as Physician (Gastroenterology)    Past Medical History: he has a past medical history of Hyperlipidemia, Hypertension, and Inguinal hernia.     Past Surgical History: he has a past surgical history that includes hx tonsillectomy; hx hernia repair (Left, 05/12/2017); and hx colonoscopy. Family Medical History: family history includes Asthma in his brother and father; Cancer in his maternal grandmother; Heart Disease in his father, maternal grandfather, and maternal grandmother; Hypertension in an other family member; Michelle Lindsay in his mother and another family member. Social History: he reports that he has never smoked. He has never used smokeless tobacco. He reports that he does not drink alcohol and does not use drugs. Works as  for [employer]. Hobbies: hiking, movies, going out to eat, reading    Health Maintenance Due   Topic Date Due    DTaP/Tdap/Td series (1 - Tdap) Never done    Shingrix Vaccine Age 50> (1 of 2) Never done       Objective:   Visit Vitals  /77 (BP 1 Location: Left upper arm, BP Patient Position: Sitting, BP Cuff Size: Adult)   Pulse 75   Temp 97.5 °F (36.4 °C) (Temporal)   Resp 18   Ht 5' 11\" (1.803 m)   Wt 207 lb (93.9 kg)   SpO2 98%   BMI 28.87 kg/m²     Wt Readings from Last 3 Encounters:   06/11/21 207 lb (93.9 kg)   11/11/20 210 lb 1.6 oz (95.3 kg)   07/24/19 204 lb 6.4 oz (92.7 kg)       Physical Exam  HENT:      Head: Normocephalic and atraumatic. Right Ear: External ear normal.      Left Ear: External ear normal.      Nose: Nose normal.      Mouth/Throat:      Mouth: Mucous membranes are moist.      Pharynx: No oropharyngeal exudate. Eyes:      General: Lids are normal. No scleral icterus. Right eye: No discharge. Left eye: No discharge. Extraocular Movements: Extraocular movements intact. Conjunctiva/sclera: Conjunctivae normal.      Pupils: Pupils are equal, round, and reactive to light. Neck:      Thyroid: No thyromegaly. Vascular: No JVD. Trachea: No tracheal deviation. Cardiovascular:      Rate and Rhythm: Normal rate and regular rhythm.       Pulses:           Radial pulses are 2+ on the right side and 2+ on the left side. Heart sounds: Normal heart sounds. No murmur heard. No friction rub. No gallop. Pulmonary:      Effort: Pulmonary effort is normal. No respiratory distress. Breath sounds: Normal breath sounds. No stridor. No wheezing. Abdominal:      General: Bowel sounds are normal. There is no distension. Palpations: Abdomen is soft. There is no mass. Tenderness: There is no abdominal tenderness. There is no guarding or rebound. Musculoskeletal:         General: No tenderness or deformity. Normal range of motion. Cervical back: Normal range of motion and neck supple. Right lower leg: No edema. Left lower leg: No edema. Lymphadenopathy:      Cervical: No cervical adenopathy. Skin:     General: Skin is warm and dry. Neurological:      Mental Status: He is alert. Cranial Nerves: No cranial nerve deficit. Coordination: Coordination normal.      Gait: Gait is intact. Deep Tendon Reflexes: Reflexes normal.   Psychiatric:         Mood and Affect: Mood normal.         Behavior: Behavior normal.         Thought Content: Thought content normal.         Assessment & Plan:      ICD-10-CM ICD-9-CM    1. Encounter to establish care  Z76.89 V65.8    2. Benign essential hypertension  I10 401.1 CBC W/O DIFF      METABOLIC PANEL, COMPREHENSIVE      URINALYSIS W/ RFLX MICROSCOPIC      TSH 3RD GENERATION      losartan (COZAAR) 50 mg tablet      TSH 3RD GENERATION      URINALYSIS W/ RFLX MICROSCOPIC      METABOLIC PANEL, COMPREHENSIVE      CBC W/O DIFF   3. GI symptoms  R19.8 787.99    4. Hyperlipidemia, unspecified hyperlipidemia type  E78.5 272.4 simvastatin (ZOCOR) 40 mg tablet   5. At risk for obstructive sleep apnea  Z91.89 V49.89 SLEEP MEDICINE REFERRAL   6. Screening for thyroid disorder  Z13.29 V77.0    7. Screening for blood or protein in urine  Z13.89 V82.9    8.  Need for shingles vaccine  Z23 V04.89 varicella-zoster recombinant, PF, [facility]) 50 mcg/0.5 mL susr injection   9. Screening for diabetes mellitus  Z13.1 V77.1 HEMOGLOBIN A1C WITH EAG      HEMOGLOBIN A1C WITH EAG   10. Encounter for hepatitis C screening test for low risk patient  Z11.59 V73.89 HCV AB W/RFLX TO TOM      HCV AB W/RFLX TO TOM        Encounter to Establish Care: New patient visit; reviewed and addressed patient's medical history and concerns as discussed in note. Discussed recommendations for diet, exercise, and lifestyle.  At Risk for Obstructive Sleep Apnea: Patient with symptoms concerning for CHENTE. Will provide referral to sleep medicine for further evaluation. All other conditions listed above: chronic and stable. Will continue current treatment regimen. Will check labs as reflected above. Discussed following up with specialist as scheduled. Discussed recommendations for diet and exercise. I have discussed the diagnosis with the patient and the intended plan as seen in the above orders. The patient has received an after-visit summary along with patient information handout. I have discussed medication side effects and warnings with the patient as well.     Disposition  Follow-up and Dispositions    · Return in about 6 months (around 12/11/2021) for annual physical.           Lan Hutchinson MD

## 2021-06-12 LAB
HCV AB S/CO SERPL IA: <0.1 S/CO RATIO (ref 0–0.9)
HCV AB SERPL QL IA: NORMAL

## 2021-08-13 DIAGNOSIS — E78.5 HYPERLIPIDEMIA, UNSPECIFIED HYPERLIPIDEMIA TYPE: ICD-10-CM

## 2021-08-13 RX ORDER — SIMVASTATIN 40 MG/1
TABLET, FILM COATED ORAL
Qty: 90 TABLET | Refills: 1 | Status: CANCELLED | OUTPATIENT
Start: 2021-08-13

## 2021-08-13 NOTE — TELEPHONE ENCOUNTER
Remi Ernst (Self) [phone] (H)     Pt called to make sure medication was sent to Dr. Dre Nowak. Would like a call back when medication is filled.

## 2021-08-16 ENCOUNTER — TELEPHONE (OUTPATIENT)
Dept: FAMILY MEDICINE CLINIC | Age: 56
End: 2021-08-16

## 2021-08-16 NOTE — TELEPHONE ENCOUNTER
Pt has questions on why his medication simvastatin (ZOCOR) 40 mg tablet     Refill request was denied

## 2021-08-16 NOTE — TELEPHONE ENCOUNTER
Please review the patient's chart. If you go to the medications section, you will see that the medication was refilled on 6/11/2021 for 3 months with 1 refill (total of 6 months). This means that the current rx should last the patient through 12/11/2021. The refill was denied because the medication was requested too early. Please call the patient to clarify why he is requesting an early refill.      Vanessa Nettles MD        Outpatient Medication Detail     Disp Refills Start End    simvastatin (ZOCOR) 40 mg tablet 90 Tablet 1 6/11/2021     Sig: TAKE 1 TABLET BY MOUTH EVERY DAY    Sent to pharmacy as: simvastatin 40 mg tablet (ZOCOR)    E-Prescribing Status: Receipt confirmed by pharmacy (6/11/2021 11:22 AM EDT)

## 2021-08-27 NOTE — TELEPHONE ENCOUNTER
Called patient to inform him that his refill request was too early and that his medication will last until 12/11/2021

## 2022-01-31 DIAGNOSIS — I10 BENIGN ESSENTIAL HYPERTENSION: ICD-10-CM

## 2022-01-31 DIAGNOSIS — E78.5 HYPERLIPIDEMIA, UNSPECIFIED HYPERLIPIDEMIA TYPE: ICD-10-CM

## 2022-01-31 RX ORDER — LOSARTAN POTASSIUM 50 MG/1
TABLET ORAL
Qty: 90 TABLET | Refills: 0 | Status: SHIPPED | OUTPATIENT
Start: 2022-01-31 | End: 2022-05-10 | Stop reason: SDUPTHER

## 2022-01-31 RX ORDER — SIMVASTATIN 40 MG/1
TABLET, FILM COATED ORAL
Qty: 90 TABLET | Refills: 0 | Status: SHIPPED | OUTPATIENT
Start: 2022-01-31 | End: 2022-05-10 | Stop reason: SDUPTHER

## 2022-01-31 NOTE — TELEPHONE ENCOUNTER
Last Visit: 6/11/21 MD Hannah Mederos, lipid 2/2021  Next Appointment: Not scheduled  Previous Refill Encounter(s): 6/11/21 90 + 1 (both)    Requested Prescriptions     Pending Prescriptions Disp Refills    losartan (COZAAR) 50 mg tablet 90 Tablet 0     Sig: TAKE 1 TABLET BY MOUTH DAILY    simvastatin (ZOCOR) 40 mg tablet 90 Tablet 0     Sig: TAKE 1 TABLET BY MOUTH EVERY DAY

## 2022-03-18 PROBLEM — K40.90 INGUINAL HERNIA, LEFT: Status: ACTIVE | Noted: 2017-03-30

## 2022-03-18 PROBLEM — E78.5 HYPERLIPIDEMIA: Status: ACTIVE | Noted: 2017-03-30

## 2022-03-19 PROBLEM — K59.04 CHRONIC IDIOPATHIC CONSTIPATION: Status: ACTIVE | Noted: 2018-05-17

## 2022-03-19 PROBLEM — K21.9 GASTROESOPHAGEAL REFLUX DISEASE WITHOUT ESOPHAGITIS: Status: ACTIVE | Noted: 2018-05-17

## 2022-03-19 PROBLEM — I10 BENIGN ESSENTIAL HYPERTENSION: Status: ACTIVE | Noted: 2017-03-30

## 2022-03-20 PROBLEM — M62.838 MUSCLE SPASM: Status: ACTIVE | Noted: 2018-05-17

## 2022-05-10 DIAGNOSIS — E78.5 HYPERLIPIDEMIA, UNSPECIFIED HYPERLIPIDEMIA TYPE: ICD-10-CM

## 2022-05-10 DIAGNOSIS — I10 BENIGN ESSENTIAL HYPERTENSION: ICD-10-CM

## 2022-05-10 RX ORDER — LOSARTAN POTASSIUM 50 MG/1
TABLET ORAL
Qty: 90 TABLET | Refills: 0 | Status: SHIPPED | OUTPATIENT
Start: 2022-05-10 | End: 2022-08-12 | Stop reason: SDUPTHER

## 2022-05-10 RX ORDER — SIMVASTATIN 40 MG/1
TABLET, FILM COATED ORAL
Qty: 90 TABLET | Refills: 0 | Status: SHIPPED | OUTPATIENT
Start: 2022-05-10 | End: 2022-09-13 | Stop reason: SDUPTHER

## 2022-05-10 NOTE — TELEPHONE ENCOUNTER
Last Visit: 6/11/21 MD Inell Means  Next Appointment: 6/1/22 MD Inell Means  Previous Refill Encounter(s): 1/31/22 90 (both)    Requested Prescriptions     Pending Prescriptions Disp Refills    losartan (COZAAR) 50 mg tablet 90 Tablet 0     Sig: TAKE 1 TABLET BY MOUTH DAILY    simvastatin (ZOCOR) 40 mg tablet 90 Tablet 0     Sig: TAKE 1 TABLET BY MOUTH EVERY DAY

## 2022-06-01 ENCOUNTER — OFFICE VISIT (OUTPATIENT)
Dept: FAMILY MEDICINE CLINIC | Age: 57
End: 2022-06-01
Payer: COMMERCIAL

## 2022-06-01 VITALS
SYSTOLIC BLOOD PRESSURE: 116 MMHG | HEIGHT: 71 IN | TEMPERATURE: 97.8 F | RESPIRATION RATE: 16 BRPM | DIASTOLIC BLOOD PRESSURE: 70 MMHG | BODY MASS INDEX: 28.98 KG/M2 | WEIGHT: 207 LBS | OXYGEN SATURATION: 97 % | HEART RATE: 93 BPM

## 2022-06-01 DIAGNOSIS — J45.20 MILD INTERMITTENT ASTHMA, UNSPECIFIED WHETHER COMPLICATED: ICD-10-CM

## 2022-06-01 DIAGNOSIS — J30.89 ENVIRONMENTAL AND SEASONAL ALLERGIES: ICD-10-CM

## 2022-06-01 DIAGNOSIS — Z12.5 SCREENING FOR PROSTATE CANCER: ICD-10-CM

## 2022-06-01 DIAGNOSIS — G47.00 INSOMNIA, UNSPECIFIED TYPE: ICD-10-CM

## 2022-06-01 DIAGNOSIS — E78.5 HYPERLIPIDEMIA, UNSPECIFIED HYPERLIPIDEMIA TYPE: ICD-10-CM

## 2022-06-01 DIAGNOSIS — I10 BENIGN ESSENTIAL HYPERTENSION: ICD-10-CM

## 2022-06-01 DIAGNOSIS — T78.1XXA GASTROINTESTINAL FOOD SENSITIVITY: Primary | ICD-10-CM

## 2022-06-01 DIAGNOSIS — Z13.1 SCREENING FOR DIABETES MELLITUS: ICD-10-CM

## 2022-06-01 DIAGNOSIS — R19.8 GI SYMPTOM: ICD-10-CM

## 2022-06-01 DIAGNOSIS — Z91.89 AT RISK FOR OBSTRUCTIVE SLEEP APNEA: ICD-10-CM

## 2022-06-01 PROCEDURE — 99213 OFFICE O/P EST LOW 20 MIN: CPT | Performed by: FAMILY MEDICINE

## 2022-06-01 RX ORDER — ALBUTEROL SULFATE 90 UG/1
1 AEROSOL, METERED RESPIRATORY (INHALATION)
Qty: 18 G | Refills: 4 | Status: SHIPPED | OUTPATIENT
Start: 2022-06-01

## 2022-06-01 NOTE — PROGRESS NOTES
Mera Brambila  62 y.o. male  1965  [address] [facility] [address]  [id number]     [facility]       Encounter Date: 6/1/2022           Established Patient Visit Note: Roman Walker MD    Reason for Appointment:  Chief Complaint   Patient presents with    Cholesterol Problem    Other     testings completed: yogurt,  egg whites, eating of rapeseeds, canola oils :  allergies     Hypertension    Sleep Problem     concern of developing more of a sleep issue. inquire of the medication relaxium . History of Present Illness:  History provided by patient    Mera Brambila is a 62 y.o. male who presents to clinic today for:         · GI Symptoms, food sensitivity:  Patient reports doing a food sensitivity test that came back showing sensitivity to yogurt, egg whits, rapeseds, and canola oils. He has seen GI in the past who recommended medicine, but he preferred dietary changes. He reports having allergy testing in the past showing allergy to ragweed and cat dander. · Insomnia: he reports taking melatonin and zquil with some improvement; symtpoms mostly a/w staying asleep. Patient was provided referral to sleep medicine for concern of CHENTE, but he did not schedule this visit. · Asthma: he reports that his breathing has been okay recently. He does not have a rescue inhaler.  He has seen pulmonology in the past.   · HTN: He is not checking BP at home  · HLD: continues Zocor, denies any muscle aches    HM  He would like to check PSA  Singles Vaccine: he would like to hold off on shingles vaccine for now    Review of Systems  All other ROS were reviewed and are negative except as discussed in HPI        Allergies: Cat dander and Ragweed pollen    Medications:     Current Outpatient Medications:     albuterol (PROVENTIL HFA, VENTOLIN HFA, PROAIR HFA) 90 mcg/actuation inhaler, Take 1 Puff by inhalation every four (4) hours as needed for Wheezing or Shortness of Breath., Disp: 18 g, Rfl: 4    losartan (COZAAR) 50 mg tablet, TAKE 1 TABLET BY MOUTH DAILY, Disp: 90 Tablet, Rfl: 0    simvastatin (ZOCOR) 40 mg tablet, TAKE 1 TABLET BY MOUTH EVERY DAY, Disp: 90 Tablet, Rfl: 0    History  Patient Care Team:  Jackeline Gross MD as PCP - General (Family Medicine)  Jackeline Gross MD as PCP - [facility] Provider  Emory Alcantara NP as Nurse Practitioner (Dermatology Physician)  Halina Valdivia MD as Physician (Gastroenterology)    Past Medical History: he has a past medical history of Hyperlipidemia, Hypertension, and Inguinal hernia. Past Surgical History: he has a past surgical history that includes hx tonsillectomy; hx hernia repair (Left, 05/12/2017); and hx colonoscopy. Family Medical History: family history includes Asthma in his brother and father; Cancer in his maternal grandmother; Heart Disease in his father, maternal grandfather, and maternal grandmother; Hypertension in an other family member; Lisset Baron in his mother and another family member. Social History: he reports that he has never smoked. He has never used smokeless tobacco. He reports that he does not drink alcohol and does not use drugs. Objective:   Visit Vitals  /70 (BP 1 Location: Left arm, BP Patient Position: Sitting, BP Cuff Size: Adult)   Pulse 93   Temp 97.8 °F (36.6 °C) (Temporal)   Resp 16   Ht 5' 11\" (1.803 m)   Wt 207 lb (93.9 kg)   SpO2 97%   BMI 28.87 kg/m²     Wt Readings from Last 3 Encounters:   06/01/22 207 lb (93.9 kg)   06/11/21 207 lb (93.9 kg)   11/11/20 210 lb 1.6 oz (95.3 kg)       Physical Exam  HENT:      Head: Normocephalic and atraumatic. Right Ear: External ear normal.      Left Ear: External ear normal.      Nose: Nose normal.      Mouth/Throat:      Pharynx: No oropharyngeal exudate. Eyes:      General: No scleral icterus. Right eye: No discharge. Left eye: No discharge.       Conjunctiva/sclera: Conjunctivae normal. Pupils: Pupils are equal, round, and reactive to light. Neck:      Thyroid: No thyromegaly. Vascular: No JVD. Trachea: No tracheal deviation. Cardiovascular:      Rate and Rhythm: Normal rate and regular rhythm. Heart sounds: Normal heart sounds. No murmur heard. No friction rub. No gallop. Pulmonary:      Effort: Pulmonary effort is normal. No respiratory distress. Breath sounds: Normal breath sounds. No stridor. No wheezing. Abdominal:      General: Bowel sounds are normal. There is no distension. Palpations: Abdomen is soft. There is no mass. Tenderness: There is no abdominal tenderness. There is no guarding or rebound. Musculoskeletal:         General: No tenderness or deformity. Normal range of motion. Cervical back: Normal range of motion and neck supple. Lymphadenopathy:      Cervical: No cervical adenopathy. Skin:     General: Skin is warm and dry. Neurological:      Mental Status: He is alert. Cranial Nerves: No cranial nerve deficit. Coordination: Coordination normal.      Gait: Gait is intact. Deep Tendon Reflexes: Reflexes normal.         Assessment & Plan:      ICD-10-CM ICD-9-CM    1. Gastrointestinal food sensitivity  T78. 1XXA 693.1 REFERRAL TO ALLERGY     787.99    2. Environmental and seasonal allergies  J30.89 477.8 REFERRAL TO ALLERGY   3. At risk for obstructive sleep apnea  Z91.89 V49.89 SLEEP MEDICINE REFERRAL   4. Mild intermittent asthma, unspecified whether complicated  X62.09 704.03 albuterol (PROVENTIL HFA, VENTOLIN HFA, PROAIR HFA) 90 mcg/actuation inhaler   5. Benign essential hypertension  I10 401.1 CBC W/O DIFF      METABOLIC PANEL, COMPREHENSIVE      URINALYSIS W/ RFLX MICROSCOPIC      TSH 3RD GENERATION      CBC W/O DIFF      METABOLIC PANEL, COMPREHENSIVE      URINALYSIS W/ RFLX MICROSCOPIC      TSH 3RD GENERATION   6.  Hyperlipidemia, unspecified hyperlipidemia type  E78.5 272.4 LIPID PANEL      LIPID PANEL   7. GI symptom  R19.8 787.99 LIPASE      LIPASE   8. Screening for prostate cancer  Z12.5 V76.44 PSA SCREENING (SCREENING)   9. Screening for diabetes mellitus  Z13.1 V77.1 HEMOGLOBIN A1C WITH EAG      HEMOGLOBIN A1C WITH EAG   10. Insomnia, unspecified type  G47.00 780.52      · GI Food Sensitivity: Chronic, uncontrolled. Referral to Allergy for further evaluation and follow up with GI as scheduled. · At Risk for Obstructive Sleep Apnea: Patient with symptoms concerning for CHENTE. Will provide referral to sleep medicine for further evaluation.  All other conditions listed above: Chronic, stable and/or managed by specialist. Will continue current treatment regimen. Will check labs as reflected above. Discussed following up with specialist as scheduled. Discussed recommendations for diet and exercise. .       I have discussed the diagnosis with the patient and the intended plan as seen in the above orders. The patient has received an after-visit summary along with patient information handout. I have discussed medication side effects and warnings with the patient as well. Disposition  Follow-up and Dispositions    · Return in about 6 months (around 12/1/2022) for follow up of chronic conditions.            Robert Milligan MD

## 2022-06-01 NOTE — PROGRESS NOTES
Chief Complaint   Patient presents with    Cholesterol Problem    Other     testings completed: yogurt,  egg whites, eating of \"rape\" seeds, canola oils :  allergies     Hypertension    Sleep Problem     concern of developing more of a sleep issue. inquire of the medication relaxium . 1. Have you been to the ER, urgent care clinic since your last visit? Hospitalized since your last visit? No    2. Have you seen or consulted any other health care providers outside of the [address] [place] [address] since your last visit? Include any pap smears or colon screening.  No

## 2022-07-29 LAB
ALBUMIN SERPL-MCNC: 4.9 G/DL (ref 3.8–4.9)
ALBUMIN/GLOB SERPL: 1.8 {RATIO} (ref 1.2–2.2)
ALP SERPL-CCNC: 70 IU/L (ref 44–121)
ALT SERPL-CCNC: 12 IU/L (ref 0–44)
APPEARANCE UR: CLEAR
AST SERPL-CCNC: 19 IU/L (ref 0–40)
BILIRUB SERPL-MCNC: 0.7 MG/DL (ref 0–1.2)
BILIRUB UR QL STRIP: NEGATIVE
BUN SERPL-MCNC: 10 MG/DL (ref 6–24)
BUN/CREAT SERPL: 11 (ref 9–20)
CALCIUM SERPL-MCNC: 9.4 MG/DL (ref 8.7–10.2)
CHLORIDE SERPL-SCNC: 102 MMOL/L (ref 96–106)
CHOLEST SERPL-MCNC: 175 MG/DL (ref 100–199)
CO2 SERPL-SCNC: 21 MMOL/L (ref 20–29)
COLOR UR: YELLOW
CREAT SERPL-MCNC: 0.91 MG/DL (ref 0.76–1.27)
EGFR: 98 ML/MIN/1.73
ERYTHROCYTE [DISTWIDTH] IN BLOOD BY AUTOMATED COUNT: 12.2 % (ref 11.6–15.4)
EST. AVERAGE GLUCOSE BLD GHB EST-MCNC: 111 MG/DL
GLOBULIN SER CALC-MCNC: 2.7 G/DL (ref 1.5–4.5)
GLUCOSE SERPL-MCNC: 96 MG/DL (ref 65–99)
GLUCOSE UR QL STRIP: NEGATIVE
HBA1C MFR BLD: 5.5 % (ref 4.8–5.6)
HCT VFR BLD AUTO: 47.4 % (ref 37.5–51)
HDLC SERPL-MCNC: 41 MG/DL
HGB BLD-MCNC: 16.5 G/DL (ref 13–17.7)
HGB UR QL STRIP: NEGATIVE
IMP & REVIEW OF LAB RESULTS: NORMAL
KETONES UR QL STRIP: NEGATIVE
LDLC SERPL CALC-MCNC: 98 MG/DL (ref 0–99)
LEUKOCYTE ESTERASE UR QL STRIP: NEGATIVE
LIPASE SERPL-CCNC: 25 U/L (ref 13–78)
MCH RBC QN AUTO: 32 PG (ref 26.6–33)
MCHC RBC AUTO-ENTMCNC: 34.8 G/DL (ref 31.5–35.7)
MCV RBC AUTO: 92 FL (ref 79–97)
MICRO URNS: NORMAL
NITRITE UR QL STRIP: NEGATIVE
PH UR STRIP: 7 [PH] (ref 5–7.5)
PLATELET # BLD AUTO: 308 X10E3/UL (ref 150–450)
POTASSIUM SERPL-SCNC: 4.3 MMOL/L (ref 3.5–5.2)
PROT SERPL-MCNC: 7.6 G/DL (ref 6–8.5)
PROT UR QL STRIP: NEGATIVE
RBC # BLD AUTO: 5.15 X10E6/UL (ref 4.14–5.8)
SODIUM SERPL-SCNC: 143 MMOL/L (ref 134–144)
SP GR UR STRIP: 1 (ref 1–1.03)
TRIGL SERPL-MCNC: 211 MG/DL (ref 0–149)
TSH SERPL DL<=0.005 MIU/L-ACNC: 3.81 UIU/ML (ref 0.45–4.5)
UROBILINOGEN UR STRIP-MCNC: 0.2 MG/DL (ref 0.2–1)
VLDLC SERPL CALC-MCNC: 36 MG/DL (ref 5–40)
WBC # BLD AUTO: 6.8 X10E3/UL (ref 3.4–10.8)

## 2022-08-12 DIAGNOSIS — I10 BENIGN ESSENTIAL HYPERTENSION: ICD-10-CM

## 2022-08-12 RX ORDER — LOSARTAN POTASSIUM 50 MG/1
TABLET ORAL
Qty: 90 TABLET | Refills: 1 | Status: SHIPPED | OUTPATIENT
Start: 2022-08-12

## 2022-08-12 NOTE — TELEPHONE ENCOUNTER
Last Visit: 6/1/22 MD Miya Jordan, labs completed 7/2022  Next Appointment: None  Previous Refill Encounter(s): 5/10/22 90    Requested Prescriptions     Pending Prescriptions Disp Refills    losartan (COZAAR) 50 mg tablet 90 Tablet 1     Sig: TAKE 1 TABLET BY MOUTH DAILY     For Pharmacy Admin Tracking Only    CPA in place:   Recommendation Provided To:    Intervention Detail: New Rx: 1, reason: Patient Preference  Gap Closed?:   Intervention Accepted By:   Time Spent (min): 5

## 2022-09-13 DIAGNOSIS — E78.5 HYPERLIPIDEMIA, UNSPECIFIED HYPERLIPIDEMIA TYPE: ICD-10-CM

## 2022-09-13 RX ORDER — SIMVASTATIN 40 MG/1
TABLET, FILM COATED ORAL
Qty: 90 TABLET | Refills: 1 | Status: SHIPPED | OUTPATIENT
Start: 2022-09-13

## 2022-09-13 NOTE — TELEPHONE ENCOUNTER
Chief Complaint   Patient presents with    Medication Refill     Zocor     Patient seen on 06/01/22. 19

## 2022-10-10 ENCOUNTER — VIRTUAL VISIT (OUTPATIENT)
Dept: FAMILY MEDICINE CLINIC | Age: 57
End: 2022-10-10
Payer: COMMERCIAL

## 2022-10-10 DIAGNOSIS — U07.1 COVID-19: Primary | ICD-10-CM

## 2022-10-10 PROCEDURE — 99214 OFFICE O/P EST MOD 30 MIN: CPT | Performed by: STUDENT IN AN ORGANIZED HEALTH CARE EDUCATION/TRAINING PROGRAM

## 2022-10-10 NOTE — PROGRESS NOTES
Nino Castellanos  62 y.o. male  1965  [address] [facility]  [address] [facility] [address]  [id number]   [facility]  Telemedicine Progress Note  Rock Aquino MD       Encounter Date and Time: October 10, 2022 at 10:12 AM    Consent:  He and/or the health care decision maker is aware that that he may receive a bill for this telephone service, depending on his insurance coverage, and has provided verbal consent to proceed: Yes    Chief Complaint   Patient presents with    Positive For Covid-19     History of Present Illness   Nino Castellanos is a 62 y.o. male was evaluated by synchronous (real-time) audio-video technology from home, through the MyChart [facility]  Wife and himself just returned from vacation and tested positive for COVID. Started with symptoms one day ago and tested positive this morning. He has a scratchy throat with fever and chills, HA and some body aches. Also with a mild nonproductive cough. No SOB, chest pressure. He is fully vaccinated and boosted x 1. Review of Systems   Review of Systems   Constitutional:  Positive for chills and fever. HENT:  Positive for sore throat. Negative for congestion. Respiratory:  Positive for cough. Negative for shortness of breath and wheezing. Cardiovascular:  Negative for chest pain and palpitations. Vitals/Objective:     General: alert, cooperative, no distress   Mental  status: mental status: alert, oriented to person, place, and time, normal mood, behavior, speech, dress, motor activity, and thought processes   Resp: resp: normal effort, no respiratory distress, no accessory muscle use, and no cough appreciated, able to easily speak in complete sentences    Neuro: neuro: no gross deficits   Skin: skin: no discoloration or lesions of concern on visible areas   Due to this being a TeleHealth evaluation, many elements of the physical examination are unable to be assessed. Assessment and Plan:     62 y.o. fully vaccinated and boosted x1 male on day  of illness with COVID 19. At risk for severe illness given age and multiple comorbidities and requesting antiviral therapy. 1. COVID-19  - nirmatrelvir-ritonavir (PAXLOVID) 300 mg (150 mg x 2)-100 mg; Take 100mg (one tablet) of ritonavir and 300mg (2 tablets) of nirmatrelvir twice daily for five days. Dispense: 1 Box; Refill: 0  - HOLD Zocor while on paxlovid  - flonase, mucinex for congestion  - robutussin, dayquil, nyquil for cough  - tylenol, ibuprofen for fever/myalgias  - cough drops and warm tea for sore throat  - reviewed expected course of illness and ED precautions including risk of recurrent COVID after taking paxlovid  - advised on quarantine guidelines including need to retest and restart quarantine if having recurrent symptoms following paxlovid course  - work note provided; [address] Paxlovid message sent      We discussed the expected course, resolution and complications of the diagnosis(es) in detail. Medication risks, benefits, costs, interactions, and alternatives were discussed as indicated. I advised him to contact the office if his condition worsens, changes or fails to improve as anticipated. He expressed understanding with the diagnosis(es) and plan. Patient understands that this encounter was a temporary measure, and the importance of further follow up and examination was emphasized. Patient verbalized understanding. Patient informed to follow up: Follow-up and Dispositions    Return if symptoms worsen or fail to improve, for routine care with dr Floresita Porter.          Electronically Signed: Blue Rene MD    Providers location when delivering service: home      Pursuant to the emergency declaration under the [facility], [facility] waiver authority and the [employer] Act, this Virtual  Visit was conducted, with patient's consent, to reduce the patient's risk of exposure to COVID-19 and provide continuity of care for an established patient. Services were provided through a video synchronous discussion virtually to substitute for in-person clinic visit. History   Patients past medical, surgical and family histories were reviewed and updated. Past Medical History:   Diagnosis Date    Hyperlipidemia     Hypertension     Inguinal hernia     left     Past Surgical History:   Procedure Laterality Date    HX COLONOSCOPY      HX HERNIA REPAIR Left 05/12/2017    laparoscopic totally extraperitoneal left inguinal hernia repair with meth     HX TONSILLECTOMY       Family History   Problem Relation Age of Onset    Macular Degen Mother     Heart Disease Father     Asthma Father     Asthma Brother     Cancer Maternal Grandmother         metastatic of unknown origin    Heart Disease Maternal Grandmother     Heart Disease Maternal Grandfather         hardening of arteries    Hypertension Other     Macular Degen Other      Social History     Socioeconomic History    Marital status:      Spouse name: Not on file    Number of children: Not on file    Years of education: Not on file    Highest education level: Not on file   Occupational History    Not on file   Tobacco Use    Smoking status: Never    Smokeless tobacco: Never   Substance and Sexual Activity    Alcohol use: No    Drug use: No    Sexual activity: Not Currently     Partners: Female   Other Topics Concern    Not on file   Social History Narrative          Lives with wife in [facility]          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            Originally from [place] [place] in [address] [place] [address]          He enjoys going to the gym at [employer] smoke exposure - no          Alcohol Use - no          Drug Use - no          Regular Exercise - yes          Smoking History: Patient has never smoked. Social Determinants of Health     Financial Resource Strain: Not on file   Food Insecurity: Not on file   Transportation Needs: Not on file   Physical Activity: Not on file   Stress: Not on file   Social Connections: Not on file   Intimate Partner Violence: Not on file   Housing Stability: Not on file     Patient Active Problem List   Diagnosis Code    Hyperlipidemia E78.5    Benign essential hypertension I10    Inguinal hernia, left K40.90    Chronic idiopathic constipation K59.04    Gastroesophageal reflux disease without esophagitis K21.9    Muscle spasm M62.838          Current Medications/Allergies   Medications and Allergies reviewed:    Current Outpatient Medications   Medication Sig Dispense Refill    nirmatrelvir-ritonavir (PAXLOVID) 300 mg (150 mg x 2)-100 mg Take 100mg (one tablet) of ritonavir and 300mg (2 tablets) of nirmatrelvir twice daily for five days. 1 Box 0    simvastatin (ZOCOR) 40 mg tablet TAKE 1 TABLET BY MOUTH EVERY DAY 90 Tablet 1    losartan (COZAAR) 50 mg tablet TAKE 1 TABLET BY MOUTH DAILY 90 Tablet 1    albuterol (PROVENTIL HFA, VENTOLIN HFA, PROAIR HFA) 90 mcg/actuation inhaler Take 1 Puff by inhalation every four (4) hours as needed for Wheezing or Shortness of Breath.  18 g 4     Allergies   Allergen Reactions    Cat Dander Other (comments)    Ragweed Pollen Other (comments)

## 2022-10-10 NOTE — LETTER
NOTIFICATION RETURN TO WORK    10/10/2022 11:02 AM    Mr. [address] [place] [id number]      To Whom It May Concern:    Nino Castellanos is currently under the care of MALATHI Oglesby 53. He should remain out of work from 10/10 - 10/14/2022 and may return to work on 10/17/2022. If there are questions or concerns please have the patient contact our office.         Sincerely,      Rock Aquino MD

## 2022-10-10 NOTE — PROGRESS NOTES
Called and discussed further with patient. He will scheduled lab visit for PSA once he has recovered from [place].

## 2023-02-15 DIAGNOSIS — I10 BENIGN ESSENTIAL HYPERTENSION: ICD-10-CM

## 2023-02-15 DIAGNOSIS — E78.5 HYPERLIPIDEMIA, UNSPECIFIED HYPERLIPIDEMIA TYPE: ICD-10-CM

## 2023-02-15 RX ORDER — SIMVASTATIN 40 MG/1
TABLET, FILM COATED ORAL
Qty: 90 TABLET | Refills: 1 | Status: SHIPPED | OUTPATIENT
Start: 2023-02-15

## 2023-02-15 RX ORDER — LOSARTAN POTASSIUM 50 MG/1
TABLET ORAL
Qty: 90 TABLET | Refills: 1 | Status: SHIPPED | OUTPATIENT
Start: 2023-02-15

## 2023-02-15 NOTE — TELEPHONE ENCOUNTER
Efra request for refills. Thanks, Edita    Last Visit: VV 10/10/22 MD Stephanie Solano, lipid 7/2022, 6/1/22 Marianela Miller  Next Appointment: Changing practice/MD Marianela Miller leaving  Previous Refill Encounter(s):   Losartan 8/12/22 90 + 1  Simvastatin 9/13/22 90 + 1    Requested Prescriptions     Pending Prescriptions Disp Refills    losartan (COZAAR) 50 mg tablet 90 Tablet 1     Sig: TAKE 1 TABLET BY MOUTH DAILY    simvastatin (ZOCOR) 40 mg tablet 90 Tablet 1     Sig: TAKE 1 TABLET BY MOUTH EVERY DAY     For Pharmacy Admin Tracking Only    Program: Medication Refill  CPA in place:   Recommendation Provided To:    Intervention Detail: New Rx: 2, reason: Patient Preference  Intervention Accepted By:   Lucy Ewing Closed?:   Time Spent (min): 5

## 2023-08-14 RX ORDER — LOSARTAN POTASSIUM 50 MG/1
50 TABLET ORAL DAILY
Qty: 90 TABLET | Refills: 0 | Status: SHIPPED | OUTPATIENT
Start: 2023-08-14

## 2023-08-14 RX ORDER — SIMVASTATIN 40 MG
40 TABLET ORAL DAILY
Qty: 90 TABLET | Refills: 0 | Status: SHIPPED | OUTPATIENT
Start: 2023-08-14

## 2023-08-14 NOTE — TELEPHONE ENCOUNTER
Patient call requesting  90 d/s of 2 meds as has changed Practice/Physician to office closer to home and appt is not until 10/16/23. (Previous patient of Carmita Brasher). Thanks, Marivel    Last appointment: VV 10/10/22 MD Parker Norman  Next appointment: New practice 10/16/23  Previous refill encounter(s): 2/15/23 90 + 1 (both)    Requested Prescriptions     Pending Prescriptions Disp Refills    losartan (COZAAR) 50 MG tablet 90 tablet 0     Sig: Take 1 tablet by mouth daily    simvastatin (ZOCOR) 40 MG tablet 90 tablet 0     Sig: Take 1 tablet by mouth daily     For Pharmacy Admin Tracking Only    Program: Medication Refill  CPA in place:    Recommendation Provided To:    Intervention Detail: New Rx: 2, reason: Patient Preference  Intervention Accepted By:   Charisma Garcia Closed?:    Time Spent (min): 5

## 2023-10-16 ENCOUNTER — OFFICE VISIT (OUTPATIENT)
Facility: CLINIC | Age: 58
End: 2023-10-16
Payer: COMMERCIAL

## 2023-10-16 VITALS
OXYGEN SATURATION: 97 % | SYSTOLIC BLOOD PRESSURE: 132 MMHG | WEIGHT: 201 LBS | RESPIRATION RATE: 20 BRPM | HEIGHT: 71 IN | TEMPERATURE: 98.1 F | HEART RATE: 105 BPM | DIASTOLIC BLOOD PRESSURE: 88 MMHG | BODY MASS INDEX: 28.14 KG/M2

## 2023-10-16 DIAGNOSIS — K59.04 CHRONIC IDIOPATHIC CONSTIPATION: ICD-10-CM

## 2023-10-16 DIAGNOSIS — I10 BENIGN ESSENTIAL HYPERTENSION: Primary | ICD-10-CM

## 2023-10-16 DIAGNOSIS — E78.2 MIXED HYPERLIPIDEMIA: ICD-10-CM

## 2023-10-16 DIAGNOSIS — Z12.5 PROSTATE CANCER SCREENING: ICD-10-CM

## 2023-10-16 DIAGNOSIS — J45.20 MILD INTERMITTENT ASTHMA WITHOUT COMPLICATION: ICD-10-CM

## 2023-10-16 PROBLEM — E78.5 HYPERLIPIDEMIA: Status: ACTIVE | Noted: 2017-03-30

## 2023-10-16 PROCEDURE — 99204 OFFICE O/P NEW MOD 45 MIN: CPT | Performed by: FAMILY MEDICINE

## 2023-10-16 PROCEDURE — 3075F SYST BP GE 130 - 139MM HG: CPT | Performed by: FAMILY MEDICINE

## 2023-10-16 PROCEDURE — 3079F DIAST BP 80-89 MM HG: CPT | Performed by: FAMILY MEDICINE

## 2023-10-16 RX ORDER — SIMVASTATIN 40 MG
40 TABLET ORAL DAILY
Qty: 90 TABLET | Refills: 3 | Status: SHIPPED | OUTPATIENT
Start: 2023-10-16

## 2023-10-16 RX ORDER — LOSARTAN POTASSIUM 50 MG/1
50 TABLET ORAL DAILY
Qty: 90 TABLET | Refills: 3 | Status: SHIPPED | OUTPATIENT
Start: 2023-10-16

## 2023-10-16 ASSESSMENT — PATIENT HEALTH QUESTIONNAIRE - PHQ9
SUM OF ALL RESPONSES TO PHQ9 QUESTIONS 1 & 2: 0
1. LITTLE INTEREST OR PLEASURE IN DOING THINGS: 0
SUM OF ALL RESPONSES TO PHQ QUESTIONS 1-9: 0
2. FEELING DOWN, DEPRESSED OR HOPELESS: 0
SUM OF ALL RESPONSES TO PHQ QUESTIONS 1-9: 0

## 2023-10-16 NOTE — PROGRESS NOTES
Chief Complaint   Patient presents with    New Patient     Np to practice no new issues or concerns today.
Hemoglobin 07/28/2022 16.5  13.0 - 17.7 g/dL Final    Hematocrit 07/28/2022 47.4  37.5 - 51.0 % Final    MCV 07/28/2022 92  79 - 97 fL Final    MCH 07/28/2022 32.0  26.6 - 33.0 pg Final    MCHC 07/28/2022 34.8  31.5 - 35.7 g/dL Final    RDW 07/28/2022 12.2  11.6 - 15.4 % Final    Platelets 51/46/3576 308  150 - 450 x10E3/uL Final    TSH 07/28/2022 3.810  0.450 - 4.500 uIU/mL Final    Hemoglobin A1C 07/28/2022 5.5  4.8 - 5.6 % Final    Comment:          Prediabetes: 5.7 - 6.4           Diabetes: >6.4           Glycemic control for adults with diabetes: <7.0      eAG 07/28/2022 111  mg/dL Final    Interpretation 07/28/2022 Note   Final    Supplemental report is available.     Cholesterol, Total 07/28/2022 175  100 - 199 mg/dL Final    Triglycerides 07/28/2022 211 (H)  0 - 149 mg/dL Final    HDL 07/28/2022 41  >39 mg/dL Final    VLDL 07/28/2022 36  5 - 40 mg/dL Final    LDL Calculated 07/28/2022 98  0 - 99 mg/dL Final    Glucose 07/28/2022 96  65 - 99 mg/dL Final    BUN 07/28/2022 10  6 - 24 mg/dL Final    Creatinine 07/28/2022 0.91  0.76 - 1.27 mg/dL Final    Est, Glomerular Filtration Rate 07/28/2022 98  >59 mL/min/1.73 Final    Bun/Cre Ratio 07/28/2022 11  9 - 20 NA Final    Sodium 07/28/2022 143  134 - 144 mmol/L Final    Potassium 07/28/2022 4.3  3.5 - 5.2 mmol/L Final    Chloride 07/28/2022 102  96 - 106 mmol/L Final    CO2 07/28/2022 21  20 - 29 mmol/L Final    Calcium 07/28/2022 9.4  8.7 - 10.2 mg/dL Final    Total Protein 07/28/2022 7.6  6.0 - 8.5 g/dL Final    Albumin 07/28/2022 4.9  3.8 - 4.9 g/dL Final    Globulin, Total 07/28/2022 2.7  1.5 - 4.5 g/dL Final    Albumin/Globulin Ratio 07/28/2022 1.8  1.2 - 2.2 NA Final    Total Bilirubin 07/28/2022 0.7  0.0 - 1.2 mg/dL Final    Alkaline Phosphatase 07/28/2022 70  44 - 121 IU/L Final    AST 07/28/2022 19  0 - 40 IU/L Final    ALT 07/28/2022 12  0 - 44 IU/L Final    Specific Gravity, UA 07/28/2022 1.005  1.005 - 1.030 NA Final    pH, UA 07/28/2022 7.0  5.0 - 7.5

## 2023-10-16 NOTE — ASSESSMENT & PLAN NOTE
Uncontrolled, seems to be low fiber and not enough water to go with the fiber. Start with this and follow up in 3 months.

## 2023-10-30 ENCOUNTER — TELEPHONE (OUTPATIENT)
Facility: CLINIC | Age: 58
End: 2023-10-30

## 2023-10-30 NOTE — TELEPHONE ENCOUNTER
----- Message from Fela Page sent at 10/30/2023  9:23 AM EDT -----  Subject: Message to Provider    QUESTIONS  Information for Provider? Patient would like to change pharmacy location,   having trouble getting meds. Please call patient. [address] [facility] [address]. #[phone]  ---------------------------------------------------------------------------  --------------  Gale Williamson INFO  [phone]; OK to leave message on voicemail  ---------------------------------------------------------------------------  --------------  SCRIPT ANSWERS  Relationship to Patient?  Self

## 2023-11-02 ENCOUNTER — PATIENT MESSAGE (OUTPATIENT)
Facility: CLINIC | Age: 58
End: 2023-11-02

## 2023-11-02 DIAGNOSIS — I10 BENIGN ESSENTIAL HYPERTENSION: ICD-10-CM

## 2023-11-03 RX ORDER — LOSARTAN POTASSIUM 50 MG/1
50 TABLET ORAL DAILY
Qty: 90 TABLET | Refills: 3 | Status: SHIPPED | OUTPATIENT
Start: 2023-11-03

## 2023-11-03 NOTE — TELEPHONE ENCOUNTER
From: Yadira Bui  To: Dr. Iva Wood: 11/2/2023 7:38 PM EDT  Subject: Losartan Refill    Hello,    Recently your office sent a refill to the [facility] at [address] [place] [address]. The Pharmacy has been closed and therefore I was not able to pick-up the prescription and I received notice that the prescription was cancelled. Would it be possible to resend the prescription refill to the [facility] at [address] [facility], please? I have few pills left of my old prescription. I appreciate your assistance with this.     Yadira Bui [phone]

## 2024-01-02 DIAGNOSIS — E78.2 MIXED HYPERLIPIDEMIA: ICD-10-CM

## 2024-01-04 RX ORDER — SIMVASTATIN 40 MG
40 TABLET ORAL DAILY
Qty: 90 TABLET | Refills: 3 | OUTPATIENT
Start: 2024-01-04

## 2024-01-04 NOTE — TELEPHONE ENCOUNTER
PCP: Duane Hernandez MD    Last appt: 10/10/2022   Future Appointments   Date Time Provider Department Center   1/18/2024  1:30 PM Duane Hernanedz MD [facility]       Requested Prescriptions     Pending Prescriptions Disp Refills    simvastatin (ZOCOR) 40 MG tablet [Pharmacy Med Name: SIMVASTATIN 40MG TABLETS] 90 tablet 3     Sig: TAKE 1 TABLET BY MOUTH DAILY         Prior labs and Blood pressures:  BP Readings from Last 3 Encounters:   10/16/23 132/88   06/01/22 116/70   06/11/21 133/77     Lab Results   Component Value Date/Time     07/28/2022 10:32 AM    K 4.3 07/28/2022 10:32 AM     07/28/2022 10:32 AM    CO2 21 07/28/2022 10:32 AM    BUN 10 07/28/2022 10:32 AM    GFRAA >60 06/11/2021 11:34 AM     No results found for: \"HBA1C\", \"RSQ4DTAD\"  Lab Results   Component Value Date/Time    CHOL 175 07/28/2022 10:32 AM    HDL 41 07/28/2022 10:32 AM    VLDL 36 07/28/2022 10:32 AM     No results found for: \"VITD3\", \"VD3RIA\"    Lab Results   Component Value Date/Time    TSH 3.810 07/28/2022 10:32 AM

## 2024-01-18 ENCOUNTER — OFFICE VISIT (OUTPATIENT)
Facility: CLINIC | Age: 59
End: 2024-01-18
Payer: COMMERCIAL

## 2024-01-18 VITALS
SYSTOLIC BLOOD PRESSURE: 125 MMHG | HEART RATE: 102 BPM | HEIGHT: 72 IN | RESPIRATION RATE: 16 BRPM | OXYGEN SATURATION: 97 % | DIASTOLIC BLOOD PRESSURE: 76 MMHG | TEMPERATURE: 97.3 F | WEIGHT: 193 LBS | BODY MASS INDEX: 26.14 KG/M2

## 2024-01-18 DIAGNOSIS — I10 BENIGN ESSENTIAL HYPERTENSION: ICD-10-CM

## 2024-01-18 DIAGNOSIS — D22.9 MULTIPLE ATYPICAL NEVI: ICD-10-CM

## 2024-01-18 DIAGNOSIS — K59.04 CHRONIC IDIOPATHIC CONSTIPATION: ICD-10-CM

## 2024-01-18 DIAGNOSIS — L72.9 SUBCUTANEOUS CYST: ICD-10-CM

## 2024-01-18 DIAGNOSIS — J45.20 MILD INTERMITTENT ASTHMA WITHOUT COMPLICATION: Primary | ICD-10-CM

## 2024-01-18 DIAGNOSIS — E78.2 MIXED HYPERLIPIDEMIA: ICD-10-CM

## 2024-01-18 PROCEDURE — 99214 OFFICE O/P EST MOD 30 MIN: CPT | Performed by: FAMILY MEDICINE

## 2024-01-18 PROCEDURE — 3078F DIAST BP <80 MM HG: CPT | Performed by: FAMILY MEDICINE

## 2024-01-18 PROCEDURE — 3074F SYST BP LT 130 MM HG: CPT | Performed by: FAMILY MEDICINE

## 2024-01-18 RX ORDER — LOSARTAN POTASSIUM 50 MG/1
50 TABLET ORAL DAILY
Qty: 90 TABLET | Refills: 3 | Status: SHIPPED | OUTPATIENT
Start: 2024-01-18

## 2024-01-18 RX ORDER — ALBUTEROL SULFATE 90 UG/1
1-2 AEROSOL, METERED RESPIRATORY (INHALATION) EVERY 6 HOURS PRN
Qty: 18 G | Refills: 5 | Status: SHIPPED | OUTPATIENT
Start: 2024-01-18

## 2024-01-18 RX ORDER — SIMVASTATIN 40 MG
40 TABLET ORAL DAILY
Qty: 90 TABLET | Refills: 3 | Status: SHIPPED | OUTPATIENT
Start: 2024-01-18

## 2024-01-18 ASSESSMENT — PATIENT HEALTH QUESTIONNAIRE - PHQ9
SUM OF ALL RESPONSES TO PHQ9 QUESTIONS 1 & 2: 0
1. LITTLE INTEREST OR PLEASURE IN DOING THINGS: 0
2. FEELING DOWN, DEPRESSED OR HOPELESS: 0
SUM OF ALL RESPONSES TO PHQ QUESTIONS 1-9: 0

## 2024-01-18 NOTE — PROGRESS NOTES
Chief Complaint   Patient presents with    Follow-up     3 mo fu for htn.  Pt did get new monitor his reading were pretty good\".        
Hx:  - functionally a problem because it takes multiple movements in the mornings to feel empty.   - no blood in stool, stool described as either dry pellets or brown poorly formed and tacky.          The following portions of the patient's history were reviewed and updated as appropriate: allergies, current medications, family history, medical history, social history, surgical history and problem list.    REVIEW OF SYSTEMS  As per HPI, otherwise comprehensive ROS unremarkable.      OBJECTIVE:    /76   Pulse (!) 102   Temp 97.3 °F (36.3 °C)   Resp 16   Ht 1.829 m (6')   Wt 87.5 kg (193 lb)   SpO2 97%   BMI 26.18 kg/m²     Physical exam:  Constitutional: well-appearing, no acute distress  Eyes: EOM intact. PERRL bilateral. Not icteric.  Cardiac: normal rate, regular rhythm.  Pulm: normal rate, normal effort.  Skin: multiple atypical nevi, and subcutaneous cyst upper L back. Normal color and turgor.  Lower extrem: no edema.      Note: relevant labs reviewed and noted in the Overview section above.          An electronic signature was used to authenticate this note.     Portions of this note may have been populated using smart dictation software and may have \"sounds-like\" errors present.     Pt was counseled on risks, benefits and alternatives of treatment options. All questions were asked and answered and the patient was agreeable with the treatment plan as outlined.    Duane Hernandez MD  [facility]  [phone]

## 2024-01-18 NOTE — ASSESSMENT & PLAN NOTE
Borderline control, improving though not quite at goal.  Still more room to go on fiber intake.  Also discussed fermented foods and probiotics.  Also could consider magnesium supplementation with either magnesium oxide 4 absorption or mag citrate if wanting to loosen up stools.

## 2024-01-18 NOTE — ASSESSMENT & PLAN NOTE
Well-controlled, continue current medications, continue current treatment plan, and lifestyle modifications recommended

## 2024-01-18 NOTE — PATIENT INSTRUCTIONS
Try Probiotics. Try Fermented Foods. Keep working on Fiber.    Top 15 Fermented Foods  Below is a list of some of the best fermented foods to include in your diet:  1. Kefir  Kefir is a fermented milk product (made from cow, goat or sheep’s milk) that tastes like a drinkable yogurt. Kefir benefits include providing high levels of vitamin B12, calcium, magnesium, vitamin K2, biotin, folate, enzymes and probiotics.  Kefir has been consumed for well over 3,000 years. The term kefir was started in [place] and [place] and means “feeling good.”  2. Kombucha  Kombucha is a fermented drink made of black tea and sugar (from various sources like cane sugar, fruit or honey). It contains a colony of bacteria and yeast that is responsible for initiating the fermentation process once combined with sugar.  Do fermented foods like kombucha contain alcohol? Kombucha has trace amounts of alcohol but too little to cause intoxication or even to be noticeable.  Other fermented foods, such as yogurt or fermented veggies, typically do not have any alcohol at all.  3. Sauerkraut  Sauerkraut is one of the oldest traditional foods, with very long roots in [place], [place] and [place] cuisine, dating back 2,000 years or more. Sauerkraut means “sour cabbage” in [place], although the Germans weren’t actually the first to make sauerkraut. (It’s believed the Chinese were.)  Made from fermented green or red cabbage, sauerkraut is high in fiber, vitamin A, vitamin C, vitamin K and B vitamins. It’s also a great source of iron, copper, calcium, sodium, manganese and magnesium.  Is store-bought sauerkraut fermented? Not always, especially the canned/processed kind.  Real, traditional, fermented sauerkraut needs to be refrigerated, is usually stored in glass jars and says that it is fermented on the package/label.  4. Pickles  Didn’t think that pickles had probiotics? Fermented pickles contain a ton vitamins and minerals, plus antioxidants and gut-friendly

## 2024-01-18 NOTE — ASSESSMENT & PLAN NOTE
Doesn't have great expiratory air movement.  Discussed discussed oral steroids versus albuterol alone.  Given the patient's overall feeling well which is some residual cough and length out from the viral infection he is more open to the idea of just refilling an inhaler.  We will do albuterol 1 to 2 puffs 4 times daily as needed for wheezing cough and dyspnea.  Also recommend getting repeat pulmonary function tests in the next month or so after his cough is cleared up.

## 2024-02-01 DIAGNOSIS — E78.2 MIXED HYPERLIPIDEMIA: ICD-10-CM

## 2024-02-01 DIAGNOSIS — Z12.5 PROSTATE CANCER SCREENING: ICD-10-CM

## 2024-02-01 DIAGNOSIS — I10 BENIGN ESSENTIAL HYPERTENSION: ICD-10-CM

## 2024-02-01 LAB
ALBUMIN SERPL-MCNC: 4.4 G/DL (ref 3.5–5)
ALBUMIN/GLOB SERPL: 1.5 (ref 1.1–2.2)
ALP SERPL-CCNC: 66 U/L (ref 45–117)
ALT SERPL-CCNC: 22 U/L (ref 12–78)
ANION GAP SERPL CALC-SCNC: 5 MMOL/L (ref 5–15)
AST SERPL-CCNC: 21 U/L (ref 15–37)
BILIRUB SERPL-MCNC: 0.9 MG/DL (ref 0.2–1)
BUN SERPL-MCNC: 13 MG/DL (ref 6–20)
BUN/CREAT SERPL: 15 (ref 12–20)
CALCIUM SERPL-MCNC: 9 MG/DL (ref 8.5–10.1)
CHLORIDE SERPL-SCNC: 105 MMOL/L (ref 97–108)
CHOLEST SERPL-MCNC: 168 MG/DL
CO2 SERPL-SCNC: 28 MMOL/L (ref 21–32)
CREAT SERPL-MCNC: 0.87 MG/DL (ref 0.7–1.3)
ERYTHROCYTE [DISTWIDTH] IN BLOOD BY AUTOMATED COUNT: 12.3 % (ref 11.5–14.5)
GLOBULIN SER CALC-MCNC: 2.9 G/DL (ref 2–4)
GLUCOSE SERPL-MCNC: 103 MG/DL (ref 65–100)
HCT VFR BLD AUTO: 45.4 % (ref 36.6–50.3)
HDLC SERPL-MCNC: 51 MG/DL
HDLC SERPL: 3.3 (ref 0–5)
HGB BLD-MCNC: 15.5 G/DL (ref 12.1–17)
LDLC SERPL CALC-MCNC: 86.2 MG/DL (ref 0–100)
MCH RBC QN AUTO: 31.7 PG (ref 26–34)
MCHC RBC AUTO-ENTMCNC: 34.1 G/DL (ref 30–36.5)
MCV RBC AUTO: 92.8 FL (ref 80–99)
NRBC # BLD: 0 K/UL (ref 0–0.01)
NRBC BLD-RTO: 0 PER 100 WBC
PLATELET # BLD AUTO: 253 K/UL (ref 150–400)
PMV BLD AUTO: 9.3 FL (ref 8.9–12.9)
POTASSIUM SERPL-SCNC: 3.8 MMOL/L (ref 3.5–5.1)
PROT SERPL-MCNC: 7.3 G/DL (ref 6.4–8.2)
PSA SERPL-MCNC: 0.5 NG/ML (ref 0.01–4)
RBC # BLD AUTO: 4.89 M/UL (ref 4.1–5.7)
SODIUM SERPL-SCNC: 138 MMOL/L (ref 136–145)
TRIGL SERPL-MCNC: 154 MG/DL
VLDLC SERPL CALC-MCNC: 30.8 MG/DL
WBC # BLD AUTO: 6.6 K/UL (ref 4.1–11.1)

## 2024-02-03 LAB
EST. AVERAGE GLUCOSE BLD GHB EST-MCNC: 100 MG/DL
HBA1C MFR BLD: 5.1 % (ref 4–5.6)

## 2024-02-16 ENCOUNTER — HOSPITAL ENCOUNTER (OUTPATIENT)
Facility: HOSPITAL | Age: 59
Discharge: HOME | End: 2024-02-16
Attending: FAMILY MEDICINE
Payer: COMMERCIAL

## 2024-02-16 DIAGNOSIS — J45.20 MILD INTERMITTENT ASTHMA WITHOUT COMPLICATION: ICD-10-CM

## 2024-02-16 PROCEDURE — 94726 PLETHYSMOGRAPHY LUNG VOLUMES: CPT

## 2024-02-16 PROCEDURE — 94010 BREATHING CAPACITY TEST: CPT

## 2024-02-16 PROCEDURE — 94060 EVALUATION OF WHEEZING: CPT

## 2024-02-16 PROCEDURE — 94729 DIFFUSING CAPACITY: CPT

## 2024-08-13 ENCOUNTER — OFFICE VISIT (OUTPATIENT)
Facility: CLINIC | Age: 59
End: 2024-08-13
Payer: COMMERCIAL

## 2024-08-13 VITALS
HEIGHT: 71 IN | RESPIRATION RATE: 16 BRPM | BODY MASS INDEX: 27.02 KG/M2 | DIASTOLIC BLOOD PRESSURE: 83 MMHG | SYSTOLIC BLOOD PRESSURE: 129 MMHG | TEMPERATURE: 97.5 F | HEART RATE: 80 BPM | WEIGHT: 193 LBS

## 2024-08-13 DIAGNOSIS — Z13.1 DIABETES MELLITUS SCREENING: ICD-10-CM

## 2024-08-13 DIAGNOSIS — Z12.5 PROSTATE CANCER SCREENING: ICD-10-CM

## 2024-08-13 DIAGNOSIS — K59.04 CHRONIC IDIOPATHIC CONSTIPATION: ICD-10-CM

## 2024-08-13 DIAGNOSIS — L72.0 EPIDERMOID CYST OF SKIN OF BACK: Primary | ICD-10-CM

## 2024-08-13 DIAGNOSIS — K21.9 GASTROESOPHAGEAL REFLUX DISEASE WITHOUT ESOPHAGITIS: ICD-10-CM

## 2024-08-13 DIAGNOSIS — J45.20 MILD INTERMITTENT ASTHMA WITHOUT COMPLICATION: ICD-10-CM

## 2024-08-13 DIAGNOSIS — I10 BENIGN ESSENTIAL HYPERTENSION: ICD-10-CM

## 2024-08-13 DIAGNOSIS — E78.2 MIXED HYPERLIPIDEMIA: ICD-10-CM

## 2024-08-13 PROBLEM — K40.90 INGUINAL HERNIA, LEFT: Status: RESOLVED | Noted: 2017-03-30 | Resolved: 2024-08-13

## 2024-08-13 PROBLEM — M62.838 MUSCLE SPASM: Status: RESOLVED | Noted: 2018-05-17 | Resolved: 2024-08-13

## 2024-08-13 PROCEDURE — 3074F SYST BP LT 130 MM HG: CPT | Performed by: FAMILY MEDICINE

## 2024-08-13 PROCEDURE — 3079F DIAST BP 80-89 MM HG: CPT | Performed by: FAMILY MEDICINE

## 2024-08-13 PROCEDURE — 99214 OFFICE O/P EST MOD 30 MIN: CPT | Performed by: FAMILY MEDICINE

## 2024-08-13 RX ORDER — LOSARTAN POTASSIUM 50 MG/1
50 TABLET ORAL DAILY
Qty: 90 TABLET | Refills: 3 | Status: SHIPPED | OUTPATIENT
Start: 2024-08-13

## 2024-08-13 RX ORDER — SIMVASTATIN 40 MG
40 TABLET ORAL DAILY
Qty: 90 TABLET | Refills: 3 | Status: SHIPPED | OUTPATIENT
Start: 2024-08-13

## 2024-08-13 SDOH — ECONOMIC STABILITY: FOOD INSECURITY: WITHIN THE PAST 12 MONTHS, THE FOOD YOU BOUGHT JUST DIDN'T LAST AND YOU DIDN'T HAVE MONEY TO GET MORE.: NEVER TRUE

## 2024-08-13 SDOH — ECONOMIC STABILITY: FOOD INSECURITY: WITHIN THE PAST 12 MONTHS, YOU WORRIED THAT YOUR FOOD WOULD RUN OUT BEFORE YOU GOT MONEY TO BUY MORE.: NEVER TRUE

## 2024-08-13 SDOH — ECONOMIC STABILITY: INCOME INSECURITY: HOW HARD IS IT FOR YOU TO PAY FOR THE VERY BASICS LIKE FOOD, HOUSING, MEDICAL CARE, AND HEATING?: NOT HARD AT ALL

## 2024-08-13 NOTE — PATIENT INSTRUCTIONS
cranberries, raspberries, strawberries, prunes, black beans, dark chocolate, apples, kale, broccoli. Also, fermented foods such as kefir, tempeh, natto, kombucha, miso, kimchi, sauerkraut, probiotic yogurt, cottage cheese, apple cider vinegar, carry antioxidant and gut-health properties.    Some reputable supplement brands include Nathan, Live Good, Pure Encapsulations, and NOW.    Also, working on sleep hygiene (visit [employer] website on sleep hygiene for more information: https://www.sleepfoundation.org/sleep-hygiene). Better sleep means better repair.    Nose-breathing has also been shown to increase Nitric Oxide which is made in our sinuses.     Getting outside regularly, getting some sunlight exposure, touching the grass, breathing fresh air, getting near running water, listening to the birds. These have real physiologic health benefits, and numerous mental health benefits. Working on mental health drastically improves oxidative stress and inflammation problems.    A Comment about Ultra-Processed Foods:  Below is a link to a video that describes what ultra processed foods is.    5 ways to identify ultra processed foods with Mikael Garcia.  [email or web address]      BELOW ARE SOME EXAMPLE REGIMENS UTILIZING THE ABOVE:    ### Meal Plan    Focus on a heart-healthy diet, rich in fruits, vegetables, whole grains, lean proteins, and healthy fats. Limit sodium, saturated fats, and sugars.    #### Daily Meal Structure  - **Breakfast:** High-fiber, moderate protein, low sugar  - **Lunch:** Balanced mix of veggies, lean protein, and whole grains  - **Dinner:** Light on carbs, rich in vegetables and lean protein  - **Snacks:** Fruits, nuts, yogurt, or vegetables    #### Weekly Menu    **Monday**  - **Breakfast:** Oatmeal with berries and a sprinkle of micaela seeds  - **Lunch:** Grilled chicken salad with mixed greens, tomatoes, cucumbers, and a light vinaigrette  - **Dinner:** Baked

## 2024-08-13 NOTE — ASSESSMENT & PLAN NOTE
Borderline controlled, continue current medications for now. Could consider trialing crestor alternatively in the future.

## 2024-08-13 NOTE — PROGRESS NOTES
Chief Complaint   Patient presents with    Follow-up     3 mo fu had a breathing test done with no major concern . No new concerns for pcp today.  [employer] is pretty good overall.     Discuss Labs     Had labs done seen results

## 2024-08-13 NOTE — PROGRESS NOTES
SUBJECTIVES  with respective ASSESSMENT/PLAN:  Mr. Carlo Campuzano is a 59 y.o. male established patient who presents for Follow-up (3 mo fu had a breathing test done with no major concern . No new concerns for pcp today.  [facility] is pretty good overall. ) and Discuss Labs (Had labs done seen results)  , found to have the followin. Epidermoid cyst of skin of back  Overview:  Upper Left back, nontender, approx 2cm diameter, punctum present. Non-changing for a while.  Assessment & Plan:    Asymptomatic, patient not interested in removal at this time.   2. Mixed hyperlipidemia  Overview:  Lab Results   Component Value Date    CHOL 168 2024    TRIG 154 (H) 2024    HDL 51 2024    LDL 86.2 2024    VLDL 30.8 2024    CHOLHDLRATIO 3.3 2024     Medication: simvastatin 40mg daily    Interval Hx:  - needs labs updated and tolerating medications.  Assessment & Plan:   Borderline controlled, continue current medications for now. Could consider trialing crestor alternatively in the future.  Orders:  -     simvastatin (ZOCOR) 40 MG tablet; Take 1 tablet by mouth daily, Disp-90 tablet, R-3Normal  -     Lipoprotein NMR; Future  -     Lipoprotein A (LPA); Future  -     CT CARDIAC CALCIUM SCORING; Future  -     Thyroid Cascade Profile; Future  3. Benign essential hypertension  Overview:  BP Readings from Last 3 Encounters:   24 125/76   10/16/23 132/88   22 116/70     Med: losartan 50mg daily    Interval Hx:  - controlled, new home BP machine is consistent with today's.  - taking meds.  Orders:  -     losartan (COZAAR) 50 MG tablet; Take 1 tablet by mouth daily, Disp-90 tablet, R-3Normal  -     CBC; Future  -     Comprehensive Metabolic Panel; Future  -     Thyroid Cascade Profile; Future  4. Chronic idiopathic constipation  Overview:  About 10 years of incomplete emptying of bowels requiring multiple trips to the restroom; stool is described as piece meal, has been worked up

## 2024-08-13 NOTE — ASSESSMENT & PLAN NOTE
Uncontrolled, go back to fiber supplement citrucel, stop eating processed foods. Work on fresher ingredients and whole foods. Take daily to once weekly probiotic. Trial magnesium glycinate 400mg daily.

## 2024-11-01 ENCOUNTER — TELEPHONE (OUTPATIENT)
Facility: CLINIC | Age: 59
End: 2024-11-01

## 2024-11-01 DIAGNOSIS — K21.9 GASTROESOPHAGEAL REFLUX DISEASE WITHOUT ESOPHAGITIS: ICD-10-CM

## 2024-11-01 DIAGNOSIS — K59.04 CHRONIC IDIOPATHIC CONSTIPATION: Primary | ICD-10-CM

## 2024-11-01 NOTE — TELEPHONE ENCOUNTER
----- Message from Luz Elena KAUR sent at 11/1/2024 10:52 AM EDT -----  Regarding: [facility] Referral Request  [facility] Referral Request    Reason for referral request: Specialty Provider    Specialist/Lab/Test patient is requesting (if known): GI doctor (gastroenterologist)    Specialist Phone Number (if applicable):    Additional Information   --------------------------------------------------------------------------------------------------------------------------    Relationship to Patient: Self     Call Back Information: OK to leave message on voicemail  Preferred Call Back Number: Phone

## 2024-11-01 NOTE — TELEPHONE ENCOUNTER
Called pt.     DX: Chronic idiopathic constipation     Provider referral has been placed and pt will call to schedule appt.

## 2025-01-14 DIAGNOSIS — I10 BENIGN ESSENTIAL HYPERTENSION: ICD-10-CM

## 2025-01-14 DIAGNOSIS — E78.2 MIXED HYPERLIPIDEMIA: ICD-10-CM

## 2025-01-15 RX ORDER — LOSARTAN POTASSIUM 50 MG/1
50 TABLET ORAL DAILY
Qty: 90 TABLET | Refills: 3 | Status: SHIPPED | OUTPATIENT
Start: 2025-01-15

## 2025-01-15 RX ORDER — SIMVASTATIN 40 MG
40 TABLET ORAL DAILY
Qty: 90 TABLET | Refills: 3 | Status: SHIPPED | OUTPATIENT
Start: 2025-01-15

## 2025-01-16 DIAGNOSIS — E78.2 MIXED HYPERLIPIDEMIA: ICD-10-CM

## 2025-01-16 RX ORDER — SIMVASTATIN 40 MG
40 TABLET ORAL DAILY
Qty: 90 TABLET | Refills: 3 | OUTPATIENT
Start: 2025-01-16

## 2025-01-31 DIAGNOSIS — Z13.1 DIABETES MELLITUS SCREENING: ICD-10-CM

## 2025-01-31 DIAGNOSIS — E78.2 MIXED HYPERLIPIDEMIA: ICD-10-CM

## 2025-01-31 DIAGNOSIS — K59.04 CHRONIC IDIOPATHIC CONSTIPATION: ICD-10-CM

## 2025-01-31 DIAGNOSIS — I10 BENIGN ESSENTIAL HYPERTENSION: ICD-10-CM

## 2025-01-31 DIAGNOSIS — Z12.5 PROSTATE CANCER SCREENING: ICD-10-CM

## 2025-01-31 LAB
ALBUMIN SERPL-MCNC: 4.4 G/DL (ref 3.5–5)
ALBUMIN/GLOB SERPL: 1.7 (ref 1.1–2.2)
ALP SERPL-CCNC: 71 U/L (ref 45–117)
ALT SERPL-CCNC: 20 U/L (ref 12–78)
ANION GAP SERPL CALC-SCNC: 5 MMOL/L (ref 2–12)
AST SERPL-CCNC: 20 U/L (ref 15–37)
BILIRUB SERPL-MCNC: 0.8 MG/DL (ref 0.2–1)
BUN SERPL-MCNC: 13 MG/DL (ref 6–20)
BUN/CREAT SERPL: 16 (ref 12–20)
CALCIUM SERPL-MCNC: 9.4 MG/DL (ref 8.5–10.1)
CHLORIDE SERPL-SCNC: 104 MMOL/L (ref 97–108)
CO2 SERPL-SCNC: 27 MMOL/L (ref 21–32)
CREAT SERPL-MCNC: 0.82 MG/DL (ref 0.7–1.3)
ERYTHROCYTE [DISTWIDTH] IN BLOOD BY AUTOMATED COUNT: 11.8 % (ref 11.5–14.5)
EST. AVERAGE GLUCOSE BLD GHB EST-MCNC: 105 MG/DL
GLOBULIN SER CALC-MCNC: 2.6 G/DL (ref 2–4)
GLUCOSE SERPL-MCNC: 95 MG/DL (ref 65–100)
HBA1C MFR BLD: 5.3 % (ref 4–5.6)
HCT VFR BLD AUTO: 44.2 % (ref 36.6–50.3)
HGB BLD-MCNC: 15.6 G/DL (ref 12.1–17)
MCH RBC QN AUTO: 31.5 PG (ref 26–34)
MCHC RBC AUTO-ENTMCNC: 35.3 G/DL (ref 30–36.5)
MCV RBC AUTO: 89.3 FL (ref 80–99)
NRBC # BLD: 0 K/UL (ref 0–0.01)
NRBC BLD-RTO: 0 PER 100 WBC
PLATELET # BLD AUTO: 299 K/UL (ref 150–400)
PMV BLD AUTO: 9 FL (ref 8.9–12.9)
POTASSIUM SERPL-SCNC: 4 MMOL/L (ref 3.5–5.1)
PROT SERPL-MCNC: 7 G/DL (ref 6.4–8.2)
PSA SERPL-MCNC: 0.9 NG/ML (ref 0.01–4)
RBC # BLD AUTO: 4.95 M/UL (ref 4.1–5.7)
SODIUM SERPL-SCNC: 136 MMOL/L (ref 136–145)
WBC # BLD AUTO: 6.9 K/UL (ref 4.1–11.1)

## 2025-02-02 LAB — TSH SERPL DL<=0.05 MIU/L-ACNC: 3.03 UIU/ML (ref 0.45–4.5)

## 2025-02-03 LAB
CHOLEST SERPL-MCNC: 167 MG/DL (ref 100–199)
HDL SERPL-SCNC: 31.4 UMOL/L
HDLC SERPL-MCNC: 43 MG/DL
LDL SERPL QN: 20.6 NM
LDL SERPL-SCNC: 1221 NMOL/L
LDL SMALL SERPL-SCNC: 625 NMOL/L
LDLC SERPL CALC-MCNC: 98 MG/DL (ref 0–99)
LP-IR SCORE SERPL: 67
LPA SERPL-SCNC: 79.9 NMOL/L
TRIGL SERPL-MCNC: 150 MG/DL (ref 0–149)

## 2025-04-27 ENCOUNTER — OFFICE VISIT (OUTPATIENT)
Age: 60
End: 2025-04-27

## 2025-04-27 VITALS
OXYGEN SATURATION: 95 % | TEMPERATURE: 97.7 F | HEIGHT: 72 IN | WEIGHT: 193.2 LBS | SYSTOLIC BLOOD PRESSURE: 127 MMHG | HEART RATE: 91 BPM | RESPIRATION RATE: 15 BRPM | BODY MASS INDEX: 26.17 KG/M2 | DIASTOLIC BLOOD PRESSURE: 85 MMHG

## 2025-04-27 DIAGNOSIS — R03.0 ELEVATED BLOOD PRESSURE READING: ICD-10-CM

## 2025-04-27 DIAGNOSIS — H65.193 ACUTE MEE (MIDDLE EAR EFFUSION), BILATERAL: ICD-10-CM

## 2025-04-27 DIAGNOSIS — J01.00 SUBACUTE MAXILLARY SINUSITIS: Primary | ICD-10-CM

## 2025-04-27 LAB — S PYO AG THROAT QL: NORMAL

## 2025-04-27 RX ORDER — LORATADINE 10 MG/1
10 TABLET ORAL DAILY
Qty: 30 TABLET | Refills: 0 | Status: SHIPPED | OUTPATIENT
Start: 2025-04-27

## 2025-04-27 RX ORDER — ECHINACEA PURPUREA EXTRACT 125 MG
1 TABLET ORAL PRN
Qty: 1 EACH | Refills: 3 | Status: SHIPPED | OUTPATIENT
Start: 2025-04-27

## 2025-04-27 RX ORDER — FLUTICASONE PROPIONATE 50 MCG
2 SPRAY, SUSPENSION (ML) NASAL DAILY
Qty: 16 G | Refills: 0 | Status: SHIPPED | OUTPATIENT
Start: 2025-04-27

## 2025-05-13 ENCOUNTER — OFFICE VISIT (OUTPATIENT)
Facility: CLINIC | Age: 60
End: 2025-05-13
Payer: COMMERCIAL

## 2025-05-13 VITALS
TEMPERATURE: 98 F | HEIGHT: 71 IN | HEART RATE: 106 BPM | SYSTOLIC BLOOD PRESSURE: 129 MMHG | WEIGHT: 195 LBS | DIASTOLIC BLOOD PRESSURE: 88 MMHG | RESPIRATION RATE: 16 BRPM | OXYGEN SATURATION: 96 % | BODY MASS INDEX: 27.3 KG/M2

## 2025-05-13 DIAGNOSIS — E78.5 HYPERLIPIDEMIA, UNSPECIFIED HYPERLIPIDEMIA TYPE: Primary | ICD-10-CM

## 2025-05-13 DIAGNOSIS — K59.04 CHRONIC IDIOPATHIC CONSTIPATION: ICD-10-CM

## 2025-05-13 DIAGNOSIS — K21.9 GASTROESOPHAGEAL REFLUX DISEASE WITHOUT ESOPHAGITIS: ICD-10-CM

## 2025-05-13 DIAGNOSIS — I10 BENIGN ESSENTIAL HYPERTENSION: ICD-10-CM

## 2025-05-13 PROCEDURE — 99214 OFFICE O/P EST MOD 30 MIN: CPT | Performed by: FAMILY MEDICINE

## 2025-05-13 PROCEDURE — 3074F SYST BP LT 130 MM HG: CPT | Performed by: FAMILY MEDICINE

## 2025-05-13 PROCEDURE — 3079F DIAST BP 80-89 MM HG: CPT | Performed by: FAMILY MEDICINE

## 2025-05-13 SDOH — ECONOMIC STABILITY: FOOD INSECURITY: WITHIN THE PAST 12 MONTHS, YOU WORRIED THAT YOUR FOOD WOULD RUN OUT BEFORE YOU GOT MONEY TO BUY MORE.: NEVER TRUE

## 2025-05-13 SDOH — ECONOMIC STABILITY: FOOD INSECURITY: WITHIN THE PAST 12 MONTHS, THE FOOD YOU BOUGHT JUST DIDN'T LAST AND YOU DIDN'T HAVE MONEY TO GET MORE.: NEVER TRUE

## 2025-05-13 ASSESSMENT — PATIENT HEALTH QUESTIONNAIRE - PHQ9
SUM OF ALL RESPONSES TO PHQ QUESTIONS 1-9: 0
1. LITTLE INTEREST OR PLEASURE IN DOING THINGS: NOT AT ALL
SUM OF ALL RESPONSES TO PHQ QUESTIONS 1-9: 0
2. FEELING DOWN, DEPRESSED OR HOPELESS: NOT AT ALL

## 2025-05-13 NOTE — PROGRESS NOTES
Assessment & Plan  1. Hypercholesterolemia: Elevated cholesterol, significant small sticky cholesterol, high triglycerides.  - On simvastatin 40 mg.  - Discussed dietary changes, reducing processed sweets, increasing fiber.  - Considered Zetia (ezetimibe), deferred.  - Mentioned citrus bergamot supplements, not covered by insurance.  - Recommended coronary calcium scan to assess plaque burden, order placed.  - Provided educational materials.  - Will reassess in 4 months after lifestyle modifications.    2. Hypertension: Well-managed.  - Blood pressure controlled today.  - Discussed potential discontinuation of losartan if lifestyle modifications effective.    3. Constipation: Improved.  - Improved with Metamucil cookies and lemon juice.  - Recommended apple cider vinegar for reflux and constipation.    4. Sinusitis: Severe, resolved.  - Severe sinus infection in 04/2025, treated with antibiotics.    Follow-up  - Follow up in 4 months for repeat cholesterol testing and basic labs.    It was a pleasure seeing Mr. Carlo Campuzano today.  Return in about 4 months (around 9/13/2025) for annual physical/HLD management.    History of Present Illness  The patient, a 60-year-old male, presents for a 6-month follow-up evaluation.    Sinus Infection  - In April, the patient experienced a severe sinus infection, which was successfully treated with antibiotics and subsequently resolved.    Dietary Modifications  - The patient acknowledges the necessity of dietary modifications, specifically reducing the intake of sweets and foods high in cholesterol.  - He reports a daily consumption of potato chips and excessive sweets, which he recognizes as detrimental to his cholesterol levels.  - He expresses a desire to implement lifestyle changes prior to considering additional pharmacological interventions.  - Currently, he is prescribed simvastatin 40 mg for hypercholesterolemia.    Constipation  - The patient's constipation has shown

## 2025-05-13 NOTE — PROGRESS NOTES
Chief Complaint   Patient presents with    Follow-up     6 mo fu things are going ok.  Has sinusitis in April since resolved.      Discuss Labs     Had labs done and has seen results.

## 2025-05-13 NOTE — PATIENT INSTRUCTIONS
Keywords and Lifestyle Strategies to consider and look up:    ACTIVITY:  General Movement is important to burn calories, keep your metabolism and mitochondria moving and functioning. Our bodies are built to be moving no less than 10,000 steps per day, and really we should strive for 20,000 steps per day. At the end of the day we should be able to say that we have been moving more than sitting or standing still.    SOLEUS PUSH-UPS: there is a group of studies showing that simple heel raises while sitting over hours can burn considerable calories and keep your base metabolic rate elevated.  How to perform a soleus push-up:   1. Seated Position:  Sit on a chair or bench with your feet flat on the floor, knees bent at 90 degrees.   2. Foot Position:  Ensure your feet are flat on the floor, with toes pointed forward or slightly outwards, depending on the desired variation.   3. Raise Heels:  Push down on the balls of your feet, lifting your heels off the floor while keeping your toes in contact.   4. Hold and Lower:  Hold the position for a second or two, then slowly lower your heels back to the starting position.   5. Repeat:  Repeat the movement for the desired number of repetitions.     Cardio keeps the heart strong, 30 minutes of moderate activity 5 days per week is the goal. Consider the \"Couch to 10K\" phone ana.    Strength and muscle building burns fats even on rest/recover days, and can even help with healthy physiologic testosterone production (men and women) to make it easier to regulate your metabolism.    High Intensity Interval Training or H-I-I-T; a method of exercising more intensely with a cominbination of muscle/strength techniques and cardio for 15-20 minutes 3 days per week.    FOODS:  LOW CARBOHYDRATE. HIGH FIBER. HEALTHY FATS. ANTIOXIDANT RICH.    Low carbohydrates:  Carbohydrates are a luxury energy source, quick burning, quick storage, inflammation promoting.    Carbs include simple sugars found in

## 2025-06-06 ENCOUNTER — HOSPITAL ENCOUNTER (OUTPATIENT)
Facility: HOSPITAL | Age: 60
Discharge: HOME | End: 2025-06-09
Attending: FAMILY MEDICINE

## 2025-06-06 ENCOUNTER — OFFICE VISIT (OUTPATIENT)
Facility: CLINIC | Age: 60
End: 2025-06-06
Payer: COMMERCIAL

## 2025-06-06 VITALS
DIASTOLIC BLOOD PRESSURE: 88 MMHG | BODY MASS INDEX: 27.3 KG/M2 | WEIGHT: 195 LBS | SYSTOLIC BLOOD PRESSURE: 137 MMHG | HEART RATE: 106 BPM | HEIGHT: 71 IN | OXYGEN SATURATION: 97 %

## 2025-06-06 DIAGNOSIS — L72.0 MILIA: Primary | ICD-10-CM

## 2025-06-06 DIAGNOSIS — E78.5 HYPERLIPIDEMIA, UNSPECIFIED HYPERLIPIDEMIA TYPE: ICD-10-CM

## 2025-06-06 PROCEDURE — 3075F SYST BP GE 130 - 139MM HG: CPT

## 2025-06-06 PROCEDURE — 3079F DIAST BP 80-89 MM HG: CPT

## 2025-06-06 PROCEDURE — 99213 OFFICE O/P EST LOW 20 MIN: CPT

## 2025-06-06 PROCEDURE — 75571 CT HRT W/O DYE W/CA TEST: CPT

## 2025-06-06 NOTE — PROGRESS NOTES
Chief Complaint   Patient presents with    Other       Subjective: Patient presented today for a possible skin tag near left eye.      \"Have you been to the ER, urgent care clinic since your last visit?  Hospitalized since your last visit?\"    NO    “Have you seen or consulted any other health care providers outside of [facility] since your last visit?”    NO        Click Here for Release of Records Request  
to Pay for Housing in the Last Year: No     Number of Times Moved in the Last Year: 0     Homeless in the Last Year: No     Allergies   Allergen Reactions    Cat Dander Other (See Comments)    Mixed Ragweed Other (See Comments)       Office Visit on 04/27/2025   Component Date Value Ref Range Status    Strep A Ag 04/27/2025 None Detected  None Detected Final       OBJECTIVE  /88 (BP Site: Left Upper Arm, Patient Position: Sitting, BP Cuff Size: Medium Adult)   Pulse (!) 106   Ht 1.803 m (5' 11\")   Wt 88.5 kg (195 lb)   SpO2 97%   BMI 27.20 kg/m²     Physical Exam  Constitutional:       Appearance: Normal appearance.   HENT:      Head: Normocephalic and atraumatic.      Right Ear: External ear normal.      Left Ear: External ear normal.      Nose: Nose normal.   Eyes:      Conjunctiva/sclera: Conjunctivae normal.   Skin:     Comments: Small milia on corner of left eye   Neurological:      Mental Status: He is alert.   Psychiatric:         Behavior: Behavior normal.         Thought Content: Thought content normal.         Judgment: Judgment normal.           ASSESSMENT & PLAN    Assessment & Plan  Milia   New. The condition is benign and typically resolves spontaneously. Advised to gently exfoliate the area daily during shower using a washcloth; discouraged use of thick facial products. Cautioned against attempting to pop the milia; over-the-counter exfoliants such as adapalene gel suggested for consideration. Referral to a dermatologist provided as a precautionary measure patient also interested in undergoing annual skin checks skin he just turned 60.    Orders:    Aiden Moran MD, [facility]          The patient (or guardian, if applicable) and other individuals in attendance with the patient were advised that Artificial Intelligence will be utilized during this visit to record, process the conversation to generate a clinical note, and support improvement 
show

## 2025-06-16 ENCOUNTER — RESULTS FOLLOW-UP (OUTPATIENT)
Facility: CLINIC | Age: 60
End: 2025-06-16